# Patient Record
Sex: MALE | Race: WHITE | NOT HISPANIC OR LATINO | Employment: OTHER | URBAN - METROPOLITAN AREA
[De-identification: names, ages, dates, MRNs, and addresses within clinical notes are randomized per-mention and may not be internally consistent; named-entity substitution may affect disease eponyms.]

---

## 2018-08-13 ENCOUNTER — OFFICE VISIT (OUTPATIENT)
Dept: MEDICAL GROUP | Facility: MEDICAL CENTER | Age: 78
End: 2018-08-13
Payer: MEDICARE

## 2018-08-13 VITALS
WEIGHT: 149 LBS | DIASTOLIC BLOOD PRESSURE: 60 MMHG | BODY MASS INDEX: 25.44 KG/M2 | HEART RATE: 92 BPM | HEIGHT: 64 IN | RESPIRATION RATE: 16 BRPM | SYSTOLIC BLOOD PRESSURE: 110 MMHG | OXYGEN SATURATION: 99 % | TEMPERATURE: 98.3 F

## 2018-08-13 DIAGNOSIS — G20.A1 PARKINSON DISEASE: ICD-10-CM

## 2018-08-13 DIAGNOSIS — G47.33 OSA (OBSTRUCTIVE SLEEP APNEA): ICD-10-CM

## 2018-08-13 DIAGNOSIS — J98.4 SCARRING OF LUNG: ICD-10-CM

## 2018-08-13 DIAGNOSIS — E78.2 MIXED HYPERLIPIDEMIA: ICD-10-CM

## 2018-08-13 DIAGNOSIS — F02.80 DEMENTIA ASSOCIATED WITH OTHER UNDERLYING DISEASE WITHOUT BEHAVIORAL DISTURBANCE (HCC): ICD-10-CM

## 2018-08-13 DIAGNOSIS — R15.9 INCONTINENCE OF FECES, UNSPECIFIED FECAL INCONTINENCE TYPE: ICD-10-CM

## 2018-08-13 DIAGNOSIS — I10 ESSENTIAL HYPERTENSION: ICD-10-CM

## 2018-08-13 DIAGNOSIS — Z00.00 HEALTH MAINTENANCE EXAMINATION: ICD-10-CM

## 2018-08-13 PROCEDURE — 99204 OFFICE O/P NEW MOD 45 MIN: CPT | Performed by: FAMILY MEDICINE

## 2018-08-13 RX ORDER — LOSARTAN POTASSIUM 50 MG/1
TABLET ORAL
COMMUNITY
Start: 2018-06-11 | End: 2018-10-11 | Stop reason: SDUPTHER

## 2018-08-13 RX ORDER — ATORVASTATIN CALCIUM 20 MG/1
20 TABLET, FILM COATED ORAL
COMMUNITY
Start: 2018-07-19 | End: 2018-10-11 | Stop reason: SDUPTHER

## 2018-08-13 RX ORDER — ASPIRIN 81 MG/1
81 TABLET, CHEWABLE ORAL DAILY
COMMUNITY
End: 2019-10-10

## 2018-08-13 RX ORDER — ESOMEPRAZOLE MAGNESIUM 40 MG/1
CAPSULE, DELAYED RELEASE ORAL
COMMUNITY
Start: 2018-06-14 | End: 2019-02-21 | Stop reason: SDUPTHER

## 2018-08-13 RX ORDER — LATANOPROST 50 UG/ML
1 SOLUTION/ DROPS OPHTHALMIC
COMMUNITY

## 2018-08-13 RX ORDER — BRINZOLAMIDE 10 MG/ML
SUSPENSION/ DROPS OPHTHALMIC
COMMUNITY
Start: 2018-08-06 | End: 2019-11-07

## 2018-08-13 RX ORDER — DONEPEZIL HYDROCHLORIDE 5 MG/1
TABLET, FILM COATED ORAL
COMMUNITY
Start: 2018-06-11 | End: 2018-09-10 | Stop reason: SDUPTHER

## 2018-08-13 NOTE — PROGRESS NOTES
CC: new patient ( PKD, HTN, HLD, GENIA, lung scar, dementia)    HPI:  Michael presents today to establish a new PCP. Has just moved from [facility].    Patient has been active, and independent with ADLs. Has the following medical issues:    Parkinson disease (HCC)  It has been slowly progressive. Has been affecting his physical abilities and memory, however he still independent physucally and mentally. Has been on Sinemet 25/100 mg TID.Used to have a neurology at [facility] wants to establish with one in [place].An appointment is already done with Dr Brown in 10/2018    Essential hypertension  Has been adequately controlled on current medication. Denies headache, chest pain, and SOB.Has been on Losartan 50 mg daily.    Mixed hyperlipidemia  He has been tolerating the statin. Denies muscle pain LFTs has been normal, hs been on Lipitor 20 mg daily.    GENIA (obstructive sleep apnea)/ Scarring of Lung   Patient has been doing fine on the CPAP.Has h/o lung scar has been following up with pulmonology in [place] wants to establish with one here.    Dementia associated with other underlying disease without behavioral disturbance  His memory has been slowly declined which is probably related to PKD. However he has been independent mentally band physically. Has been tolerating the Aricept.    Incontinence of feces, unspecified fecal incontinence type  It has been a chronic issue,he has had multiple work up and GI consults at [place], eventualy he was told that he need to live with it.He stated that it has an accident every once in a while.has been avoiding socialization because of that. Denies depression, he learned to live with it.    As per patient all vaccinations are UTD,no records.Awaiting his old records from his PCP in [place].      Patient Active Problem List    Diagnosis Date Noted   • Parkinson disease (HCC) 08/13/2018   • Essential hypertension 08/13/2018   • Mixed hyperlipidemia 08/13/2018   • GENIA (obstructive sleep  "apnea) 08/13/2018   • Dementia associated with other underlying disease without behavioral disturbance 08/13/2018   • Incontinence of feces 08/13/2018       Current Outpatient Prescriptions   Medication Sig Dispense Refill   • atorvastatin (LIPITOR) 20 MG Tab Take 20 mg by mouth.     • AZOPT 1 % Suspension      • carbidopa-levodopa (SINEMET)  MG Tab      • donepezil (ARICEPT) 5 MG Tab      • esomeprazole (NEXIUM) 40 MG delayed-release capsule      • latanoprost (XALATAN) 0.005 % Solution      • losartan (COZAAR) 50 MG Tab      • aspirin (ASA) 81 MG Chew Tab chewable tablet Take 81 mg by mouth every day.     • Cholecalciferol 4000 units Cap Take  by mouth.       No current facility-administered medications for this visit.          Allergies as of 08/13/2018   • (Not on File)        Social History     Social History   • Marital status:      Spouse name: N/A   • Number of children: N/A   • Years of education: N/A     Occupational History   • Not on file.     Social History Main Topics   • Smoking status: Not on file   • Smokeless tobacco: Not on file   • Alcohol use Not on file   • Drug use: Unknown   • Sexual activity: Not on file     Other Topics Concern   • Not on file     Social History Narrative   • No narrative on file       No family history on file.    No past surgical history on file.    ROS:  Denies any Headache, Blurred Vision, Confusion Chest pain,  Shortness of breath,  Abdominal pain, Changes of bowel or bladder, Lower ext edema, Fevers, Nights sweats, Weight Changes, Focal weakness or numbness.  All other systems are negative.    /60   Pulse 92   Temp 36.8 °C (98.3 °F)   Resp 16   Ht 1.63 m (5' 4.17\")   Wt 67.6 kg (149 lb)   SpO2 99%   BMI 25.44 kg/m²     Physical Exam:  Gen:         Alert and oriented, No apparent distress.  HEENT:   Perrla, TM clear,  Oralpharynx no erythema or exudates.  Neck:       No Jugular venous distension, Lymphadenopathy, Thyromegaly, Bruits.  Lungs:   "   Clear to auscultation bilaterally  CV:          Regular rate and rhythm. No murmurs, rubs or gallops.  Abd:         Soft non tender, non distended. Normal active bowel sounds. No                                        Hepatosplenomegaly, No pulsatile masses.  Ext:          No clubbing, cyanosis, edema.      Assessment and Plan.   78 y.o. male     1. Parkinson disease (HCC)  Stable.  Has been on Sinemet 25/100 mg TID.  An appointment is already done with Dr Brown in 10/2018    2. Essential hypertension  Has been adequately controlled on current medication. Denies headache, chest pain, and SOB.  Continue on Losartan 50 mg daily.    - CBC WITH DIFFERENTIAL; Future  - LIPID PANEL  - COMP METABOLIC PANEL; Future    3. Mixed hyperlipidemia  He has been tolerating the statin. Denies muscle pain LFTs has been normal  Continue on Lipitor 20 mg daily.    - LIPID PANEL  - TSH; Future    4. GENIA (obstructive sleep apnea)/ Scarring of Lung   Patient has been doing fine on the CPAP.  Has h/o lung scar has been following up with pulmonology in [place] wants to establish with one here.    - REFERRAL TO PULMONOLOGY    5. Dementia associated with other underlying disease without behavioral disturbance  Probably PKD related,memory has been slowly declined. However he has been independent mentally band physically  Continue in Aricept, no side effects.    6. Incontinence of feces, unspecified fecal incontinence type  A chronic issue, has had multiple work up and GI consults at [place], eventualy was told he need to live with it.    7. Health maintenance examination  As per patient all vaccinations are UTD,

## 2018-08-15 ENCOUNTER — TELEPHONE (OUTPATIENT)
Dept: MEDICAL GROUP | Facility: MEDICAL CENTER | Age: 78
End: 2018-08-15

## 2018-08-15 NOTE — TELEPHONE ENCOUNTER
[phone] ([place])   Phone Number Called:     Message: left patient a message returning his call to see how we can help him    Left Message for patient to call back: yes

## 2018-08-16 ENCOUNTER — PATIENT MESSAGE (OUTPATIENT)
Dept: MEDICAL GROUP | Facility: MEDICAL CENTER | Age: 78
End: 2018-08-16

## 2018-08-16 NOTE — TELEPHONE ENCOUNTER
From: Michael Packer  To: Edwar Beal M.D.  Sent: 8/16/2018 9:34 AM PDT  Subject: Non-Urgent Medical Question    Gastroento)ogy Dr. MAURICIO SAMUELS

## 2018-09-10 DIAGNOSIS — F02.80 DEMENTIA ASSOCIATED WITH OTHER UNDERLYING DISEASE WITHOUT BEHAVIORAL DISTURBANCE (HCC): ICD-10-CM

## 2018-09-10 RX ORDER — DONEPEZIL HYDROCHLORIDE 5 MG/1
TABLET, FILM COATED ORAL
Qty: 30 TAB | Status: CANCELLED | OUTPATIENT
Start: 2018-09-10

## 2018-09-10 RX ORDER — DONEPEZIL HYDROCHLORIDE 5 MG/1
5 TABLET, FILM COATED ORAL DAILY
Qty: 90 TAB | Refills: 3 | Status: SHIPPED | OUTPATIENT
Start: 2018-09-10 | End: 2018-11-09

## 2018-10-11 RX ORDER — LOSARTAN POTASSIUM 50 MG/1
50 TABLET ORAL DAILY
Qty: 90 TAB | Refills: 3 | Status: SHIPPED | OUTPATIENT
Start: 2018-10-11 | End: 2018-11-13 | Stop reason: SDUPTHER

## 2018-10-11 RX ORDER — ATORVASTATIN CALCIUM 20 MG/1
20 TABLET, FILM COATED ORAL DAILY
Qty: 90 TAB | Refills: 3 | Status: SHIPPED | OUTPATIENT
Start: 2018-10-11 | End: 2018-11-13 | Stop reason: SDUPTHER

## 2018-11-08 ENCOUNTER — HOSPITAL ENCOUNTER (OUTPATIENT)
Dept: LAB | Facility: MEDICAL CENTER | Age: 78
End: 2018-11-08
Attending: FAMILY MEDICINE
Payer: MEDICARE

## 2018-11-08 DIAGNOSIS — E78.2 MIXED HYPERLIPIDEMIA: ICD-10-CM

## 2018-11-08 DIAGNOSIS — I10 ESSENTIAL HYPERTENSION: ICD-10-CM

## 2018-11-08 LAB
ALBUMIN SERPL BCP-MCNC: 3.9 G/DL (ref 3.2–4.9)
ALBUMIN/GLOB SERPL: 1.6 G/DL
ALP SERPL-CCNC: 65 U/L (ref 30–99)
ALT SERPL-CCNC: <5 U/L (ref 2–50)
ANION GAP SERPL CALC-SCNC: 8 MMOL/L (ref 0–11.9)
AST SERPL-CCNC: 17 U/L (ref 12–45)
BASOPHILS # BLD AUTO: 0.8 % (ref 0–1.8)
BASOPHILS # BLD: 0.07 K/UL (ref 0–0.12)
BILIRUB SERPL-MCNC: 0.5 MG/DL (ref 0.1–1.5)
BUN SERPL-MCNC: 19 MG/DL (ref 8–22)
CALCIUM SERPL-MCNC: 9.4 MG/DL (ref 8.5–10.5)
CHLORIDE SERPL-SCNC: 107 MMOL/L (ref 96–112)
CHOLEST SERPL-MCNC: 150 MG/DL (ref 100–199)
CO2 SERPL-SCNC: 26 MMOL/L (ref 20–33)
CREAT SERPL-MCNC: 0.89 MG/DL (ref 0.5–1.4)
EOSINOPHIL # BLD AUTO: 0.28 K/UL (ref 0–0.51)
EOSINOPHIL NFR BLD: 3.1 % (ref 0–6.9)
ERYTHROCYTE [DISTWIDTH] IN BLOOD BY AUTOMATED COUNT: 46.3 FL (ref 35.9–50)
FASTING STATUS PATIENT QL REPORTED: NORMAL
GLOBULIN SER CALC-MCNC: 2.5 G/DL (ref 1.9–3.5)
GLUCOSE SERPL-MCNC: 101 MG/DL (ref 65–99)
HCT VFR BLD AUTO: 47.5 % (ref 42–52)
HDLC SERPL-MCNC: 45 MG/DL
HGB BLD-MCNC: 15.3 G/DL (ref 14–18)
IMM GRANULOCYTES # BLD AUTO: 0.06 K/UL (ref 0–0.11)
IMM GRANULOCYTES NFR BLD AUTO: 0.7 % (ref 0–0.9)
LDLC SERPL CALC-MCNC: 83 MG/DL
LYMPHOCYTES # BLD AUTO: 1.59 K/UL (ref 1–4.8)
LYMPHOCYTES NFR BLD: 17.4 % (ref 22–41)
MCH RBC QN AUTO: 30.5 PG (ref 27–33)
MCHC RBC AUTO-ENTMCNC: 32.2 G/DL (ref 33.7–35.3)
MCV RBC AUTO: 94.8 FL (ref 81.4–97.8)
MONOCYTES # BLD AUTO: 0.88 K/UL (ref 0–0.85)
MONOCYTES NFR BLD AUTO: 9.6 % (ref 0–13.4)
NEUTROPHILS # BLD AUTO: 6.28 K/UL (ref 1.82–7.42)
NEUTROPHILS NFR BLD: 68.4 % (ref 44–72)
NRBC # BLD AUTO: 0 K/UL
NRBC BLD-RTO: 0 /100 WBC
PLATELET # BLD AUTO: 204 K/UL (ref 164–446)
PMV BLD AUTO: 10.4 FL (ref 9–12.9)
POTASSIUM SERPL-SCNC: 4.3 MMOL/L (ref 3.6–5.5)
PROT SERPL-MCNC: 6.4 G/DL (ref 6–8.2)
RBC # BLD AUTO: 5.01 M/UL (ref 4.7–6.1)
SODIUM SERPL-SCNC: 141 MMOL/L (ref 135–145)
TRIGL SERPL-MCNC: 111 MG/DL (ref 0–149)
TSH SERPL DL<=0.005 MIU/L-ACNC: 2.84 UIU/ML (ref 0.38–5.33)
WBC # BLD AUTO: 9.2 K/UL (ref 4.8–10.8)

## 2018-11-08 PROCEDURE — 85025 COMPLETE CBC W/AUTO DIFF WBC: CPT

## 2018-11-08 PROCEDURE — 80061 LIPID PANEL: CPT

## 2018-11-08 PROCEDURE — 80053 COMPREHEN METABOLIC PANEL: CPT

## 2018-11-08 PROCEDURE — 84443 ASSAY THYROID STIM HORMONE: CPT

## 2018-11-08 PROCEDURE — 36415 COLL VENOUS BLD VENIPUNCTURE: CPT

## 2018-11-09 ENCOUNTER — OFFICE VISIT (OUTPATIENT)
Dept: NEUROLOGY | Facility: MEDICAL CENTER | Age: 78
End: 2018-11-09
Payer: MEDICARE

## 2018-11-09 VITALS
OXYGEN SATURATION: 93 % | SYSTOLIC BLOOD PRESSURE: 126 MMHG | HEART RATE: 79 BPM | TEMPERATURE: 97.8 F | WEIGHT: 149 LBS | BODY MASS INDEX: 23.95 KG/M2 | DIASTOLIC BLOOD PRESSURE: 74 MMHG | RESPIRATION RATE: 18 BRPM | HEIGHT: 66 IN

## 2018-11-09 DIAGNOSIS — G20.A1 PARKINSON DISEASE: Primary | ICD-10-CM

## 2018-11-09 PROCEDURE — 99205 OFFICE O/P NEW HI 60 MIN: CPT | Performed by: PSYCHIATRY & NEUROLOGY

## 2018-11-09 RX ORDER — ROPINIROLE 2 MG/1
TABLET, FILM COATED, EXTENDED RELEASE ORAL
Qty: 90 TAB | Refills: 2 | Status: SHIPPED | OUTPATIENT
Start: 2018-11-09 | End: 2018-12-24 | Stop reason: SDUPTHER

## 2018-11-09 ASSESSMENT — ENCOUNTER SYMPTOMS
SPEECH CHANGE: 1
SENSORY CHANGE: 0
LOSS OF CONSCIOUSNESS: 0
CONSTIPATION: 0
DIZZINESS: 0
DEPRESSION: 0
INSOMNIA: 0
TREMORS: 1
MEMORY LOSS: 1
HALLUCINATIONS: 0

## 2018-11-09 ASSESSMENT — PATIENT HEALTH QUESTIONNAIRE - PHQ9: CLINICAL INTERPRETATION OF PHQ2 SCORE: 0

## 2018-11-10 NOTE — PROGRESS NOTES
Subjective:      Michael Packer is a 78 y.o. male who presents his wife Cecilia, for consultation from the office of Dr. Cuellar, with a history of Parkinson's disease and associated cognitive impairment.     MIRELLA Rodriguez is a pleasant 78-year-old right-handed gentleman who symptoms started may be about 2 years ago, probably longer, but his diagnosis and treatment started about 2 years ago.    Originally he presented with a right upper extremity rigidity and tremulousness.  Cognition seem to be a little slowed but it was not the major problem.  He was sleeping well, there were no issues with hallucinations or delirium, it was just taking him longer to do things.  He was a little bit unsteady when he was walking.  He also has had issues with anosmia for years.    Seen by a local neurologist, MRI imaging and even an EEG study were done, they were never told the specifics as to the results themselves.  He was placed on Sinemet 25/100, twice daily, and the symptoms actually improved, though over time the efficacy diminished.  The drug now wears off at about 3-4 p.m., he takes the doses at 8 AM and 7 PM.  He takes them also with food!  Because of the cognitive issues, Aricept was added one year ago, but at 5 mg daily, neither 1 of them have been overly impressed.  At present, they deny freezing, significant fluctuations on a day-to-day basis and symptoms and dose response, peak-dose dyskinesias, hallucinations, etc.    At present he does have real problems with his thinking.  He is much slower to respond, mental processing in general is slower, it takes him longer to finish activities that had once been automatic and easy.  He has been forgetting passwords while on the computer, evidently stopped dealing with their financial investments because they had become too complex.  He does his own medications though Meghan will have to remind him on an occasion.  Major changes in their life, such as their move from [facility]  [place] to the [place] area, can put him into a tailspin, he actually was severely depressed for months, lost about 15 pounds, etc.    Speech is a little softer, chewing and swallowing are unchanged, he does not drool.  The right hand tremor has gotten a little worse, handwriting and dexterity with the right hand have taken a nose dive.  Overall strength is intact.  Stride length is diminished, he notes a slowness on initiation of all movements, things always improved slightly once he gets going.  He does not fall with regularity.  He fatigues quite easily.  Standing up from seats is a little more difficult especially if it is low-slung.    Constipation and urinary retention or not an issue, in fact he has more of a chronic issue with urinary incontinence.  They deny issues with early satiety and frequent nausea or vomiting, weight loss, orthostatic dizziness and syncope, etc.    He has a history of glaucoma, GENIA, hypertension, dyslipidemia, hearing impairment and GERD, no history of CVA, CAD, PVD, malignancy, thyroid disease, autoimmune disease, psychiatric disease, liver or kidney disease, blood dyscrasia, or pulmonary disease.  There is no surgical history of note from my standpoint.    His mother  at 93, his father at 86, he may have suffered from stroke, his one brother did suffer from stroke and hypertension, his 4 children are alive and well.  No one in the family tree has a history of neurodegenerative disease.  He does not smoke or drink.  He has a one-year level college education, worked as a clerical staff member.    He is on baby aspirin daily, Lipitor 20 mg daily, Cozaar 50 mg daily, Nexium 40 mg daily, Azopt and Zilactin eyedrops, Sinemet 25/100, twice daily, and Aricept 5 mg every morning.    Review of Systems   Constitutional: Positive for malaise/fatigue.   Cardiovascular: Negative for leg swelling.   Gastrointestinal: Negative for constipation.   Genitourinary: Negative for dysuria.   Neurological:  "Positive for tremors and speech change. Negative for dizziness, sensory change and loss of consciousness.   Psychiatric/Behavioral: Positive for memory loss. Negative for depression and hallucinations. The patient does not have insomnia.    All other systems reviewed and are negative.        Objective:     /74   Pulse 79   Temp 36.6 °C (97.8 °F)   Resp 18   Ht 1.676 m (5' 6\")   Wt 67.6 kg (149 lb)   SpO2 93%   BMI 24.05 kg/m²      Physical Exam    He appears in no acute distress.  His vital signs are stable.  There is no malar rash, temporal or jaw tenderness, jaw claudication, or sialorrhea.  The neck is supple, range of motion is full, carotid pulses are present bilaterally without asymmetry.  Cardiac evaluation reveals a regular rhythm.  There is minimal bilateral lower extremity edema only.    His mental processing speed is slowed, he is a little perseverative, there is some mild apraxia only, but he is still fully oriented, there is no aphasia, agnosia or inattention.    PERRLA/EOMI, visual fields are full to finger counting and confrontation bilaterally, funduscopic exam could not be done because of myopia, there is hypophonia and mild tachyphemia, eye blink frequency is clearly reduced, facial movements are symmetric, sensory exam was intact to temperature and pinprick bilaterally, the tongue and uvula are midline, shoulder shrug and head rotation are intact.    Musculoskeletal exam reveals bilateral rigidity, right greater than left, high amplitude and low frequency resting tremor is seen with the right hand, increased with distraction, decreasing with movement initiation and then re-emergent.  There is generalized bradykinesia, there is no asterixis or drift.  Strength is intact in all 4 extremities.  Reflexes are brisk and present throughout, the ankle jerks are clearly diminished bilaterally though they are present.  Both toes are downgoing.    He stands slowly but independently, arms folded " in front.  There is slight hesitancy on gait initiation only, he turns stiffly but easily, requires only 3 steps.  The right arm swing is clearly diminished when compared to the left, there is some stiffness when moving the right leg but stride length is maintained otherwise.  There is some mild retropulsion.  There is no appendicular dystaxia but throughout, repetitive movements and fine motor control showed diminished amplitudes and increased frequencies bilaterally.    Sensory exam is intact to temperature and pinprick, there is a minimal stocking pattern loss of vibration below the ankles bilaterally.  Romberg is absent.     Assessment/Plan:     1. Parkinson disease (HCC)  The diagnosis I think is fairly straightforward, in this clinical setting, absent associated symptoms, given the asymmetric presentation and what seems to be a very clear response to dopamine, I do not think Parkinson's-plus syndrome such as Lewy Body Dementia, MSA, PSP, etc., or less likely.  Imaging has been done, I suspect that no news was good news at the time, thus structural pathology such as ischemia, NPH, mass, heavy metal deposition, etc. have been ruled out.  In those circumstances, clinically, dopamine would have provided no benefit.    Unfortunately, even Parkinson's-plus syndromes can respond to dopamine early on, but treatments would still be the same.  For now I think Sinemet needs to be changed to avoid the wearing-off, he also needs to be told to take the drug 30 minutes before he eats to avoid interaction with protein that is consumed.  Thus, Sinemet will be taken at 7 AM and 4 PM, at least 30 minutes before each meal.  Requip ER 2 mg daily will be started with the first dose of Sinemet, in 2 weeks the dose increased to 4 mg.  The rationale for this was reviewed in full.  Side effects were reviewed.    The cognitive symptoms he has are not unheard of, in fact over 60% of patients with Parkinson's disease have  neuropsychological symptoms.  Aricept is not the drug of choice though it can provide benefit, so I would discontinue the drug immediately and follow along, eventually I would like to add Exelon.  One drug change at a time is necessary.  The rationale for this was reviewed.    We talked at length about the nature of his disease, why I suspect this is his diagnosis, what to expect moving forwards, prognosis, expected long-term progression, etc.  We will play phone tag as we adjust his medicines, we will follow-up otherwise in about 5 months.    - carbidopa-levodopa (SINEMET)  MG Tab; Take 1 Tab by mouth 2 times a day. Take at 7AM and 4PM  Dispense: 60 Tab; Refill: 4  - Ropinirole HCl 2 MG TABLET SR 24 HR; 1 tab daily for 2 weeks, increase by 1 tab every 2 weeks up to 3 tab daily  Dispense: 90 Tab; Refill: 2    Time: 60 minutes spent face-to-face for exam, review, discussion, and education, of this over 50% of time spent counseling and coordinating care.

## 2018-11-13 ENCOUNTER — OFFICE VISIT (OUTPATIENT)
Dept: MEDICAL GROUP | Facility: MEDICAL CENTER | Age: 78
End: 2018-11-13
Payer: MEDICARE

## 2018-11-13 VITALS
HEART RATE: 88 BPM | RESPIRATION RATE: 16 BRPM | SYSTOLIC BLOOD PRESSURE: 112 MMHG | DIASTOLIC BLOOD PRESSURE: 74 MMHG | OXYGEN SATURATION: 92 % | HEIGHT: 66 IN | TEMPERATURE: 98.2 F | BODY MASS INDEX: 24.91 KG/M2 | WEIGHT: 155 LBS

## 2018-11-13 DIAGNOSIS — G47.33 OSA (OBSTRUCTIVE SLEEP APNEA): ICD-10-CM

## 2018-11-13 DIAGNOSIS — F02.80 DEMENTIA ASSOCIATED WITH OTHER UNDERLYING DISEASE WITHOUT BEHAVIORAL DISTURBANCE (HCC): ICD-10-CM

## 2018-11-13 DIAGNOSIS — K21.9 GASTROESOPHAGEAL REFLUX DISEASE WITHOUT ESOPHAGITIS: ICD-10-CM

## 2018-11-13 DIAGNOSIS — I10 ESSENTIAL HYPERTENSION: ICD-10-CM

## 2018-11-13 DIAGNOSIS — E78.2 MIXED HYPERLIPIDEMIA: ICD-10-CM

## 2018-11-13 DIAGNOSIS — J84.9 INTERSTITIAL LUNG DISEASE (HCC): ICD-10-CM

## 2018-11-13 DIAGNOSIS — Z23 NEED FOR VACCINATION: ICD-10-CM

## 2018-11-13 DIAGNOSIS — G20.A1 PARKINSON DISEASE: ICD-10-CM

## 2018-11-13 PROCEDURE — 99214 OFFICE O/P EST MOD 30 MIN: CPT | Mod: 25 | Performed by: FAMILY MEDICINE

## 2018-11-13 PROCEDURE — 90662 IIV NO PRSV INCREASED AG IM: CPT | Performed by: FAMILY MEDICINE

## 2018-11-13 PROCEDURE — G0008 ADMIN INFLUENZA VIRUS VAC: HCPCS | Performed by: FAMILY MEDICINE

## 2018-11-13 RX ORDER — ATORVASTATIN CALCIUM 20 MG/1
20 TABLET, FILM COATED ORAL DAILY
Qty: 90 TAB | Refills: 3 | Status: SHIPPED | OUTPATIENT
Start: 2018-11-13 | End: 2019-12-23 | Stop reason: SDUPTHER

## 2018-11-13 RX ORDER — RANITIDINE 150 MG/1
150 TABLET ORAL 2 TIMES DAILY
Qty: 60 TAB | Refills: 11 | Status: SHIPPED | OUTPATIENT
Start: 2018-11-13 | End: 2019-10-10

## 2018-11-13 RX ORDER — LOSARTAN POTASSIUM 50 MG/1
50 TABLET ORAL DAILY
Qty: 90 TAB | Refills: 3 | Status: SHIPPED | OUTPATIENT
Start: 2018-11-13 | End: 2020-01-01

## 2018-11-13 NOTE — PROGRESS NOTES
CC: Parkinson disease, hypertension, hyperlipidemia, sleep apnea, memory problems, acid reflux.    HPI:   Michael presents today to discuss the following    Parkinson disease (HCC)  It has been slowly progressive. Has been affecting his physical abilities and memory, however he still independent physically and mentally. Has been on Sinemet 25/100 mg twice daily, ropinirole 2 mg daily was added recently by the neurology Dr. Welsh.     Essential hypertension  Has been adequately controlled on current medication. Denies headache, chest pain, and SOB.Has been on Losartan 50 mg daily.     Mixed hyperlipidemia  He has been tolerating the statin. Denies muscle pain LFTs has been normal, hs been on Lipitor 20 mg daily.     GENIA (obstructive sleep apnea)/ Scarring of Lung / Interstitial lung disease (HCC)  Patient has history of interstitial lung disease however she has been has been asymptomatic. He has been doing fine on the CPAP.Has h/o lung scar has been following up with pulmonology in [place], requested referral to pulmonology.     Dementia associated with other underlying disease without behavioral disturbance  His memory has been slowly declined which is probably related to PKD. However he has been independent mentally band physically.  Patient was seen by neurology Dr. Welsh who changed Aricept to Exelon.    Gastroesophageal reflux disease without esophagitis  Denies epigastric pain, heartburn, nausea, and vomiting.  He has been doing fine on Nexium 40 mg daily.  However we discussed side effects of PPI, discussed changing the medication to H2 antagonist.    He is due for the flu shot          Patient Active Problem List    Diagnosis Date Noted   • Parkinson disease (HCC) 08/13/2018   • Essential hypertension 08/13/2018   • Mixed hyperlipidemia 08/13/2018   • GENIA (obstructive sleep apnea) 08/13/2018   • Dementia associated with other underlying disease without behavioral disturbance 08/13/2018   • Incontinence of  "feces 08/13/2018       Current Outpatient Prescriptions   Medication Sig Dispense Refill   • carbidopa-levodopa (SINEMET)  MG Tab Take 1 Tab by mouth 2 times a day. Take at 7AM and 4PM 60 Tab 4   • Ropinirole HCl 2 MG TABLET SR 24 HR 1 tab daily for 2 weeks, increase by 1 tab every 2 weeks up to 3 tab daily 90 Tab 2   • losartan (COZAAR) 50 MG Tab Take 1 Tab by mouth every day. 90 Tab 3   • atorvastatin (LIPITOR) 20 MG Tab Take 1 Tab by mouth every day. 90 Tab 3   • AZOPT 1 % Suspension      • esomeprazole (NEXIUM) 40 MG delayed-release capsule      • latanoprost (XALATAN) 0.005 % Solution      • aspirin (ASA) 81 MG Chew Tab chewable tablet Take 81 mg by mouth every day.     • Cholecalciferol 4000 units Cap Take  by mouth.       No current facility-administered medications for this visit.          Allergies as of 11/13/2018   • (No Known Allergies)        ROS: Denies any chest pain, Shortness of breath, Changes bowel or bladder, Lower extremity edema.    Physical Exam:  /74 (BP Location: Right arm, Patient Position: Sitting, BP Cuff Size: Adult)   Pulse 88   Temp 36.8 °C (98.2 °F)   Resp 16   Ht 1.676 m (5' 6\")   Wt 70.3 kg (155 lb)   SpO2 92%   BMI 25.02 kg/m²   Gen.: Well-developed, well-nourished, no apparent distress,pleasant and cooperative with the examination  Skin:  Warm and dry with good turgor. No rashes or suspicious lesions in visible areas  HEENT:Sinuses nontender with palpation, TMs clear, nares patent with pink mucosa and clear rhinorrhea,no septal deviation ,polyps or lesions. lips without lesions, oropharynx clear.  Neck: Trachea midline,no masses or adenopathy. No JVD.  Cor: Regular rate and rhythm without murmur, gallop or rub.  Lungs: Respirations unlabored.Clear to auscultation with equal breath sounds bilaterally. No wheezes, rhonchi.  Extremities: No cyanosis, clubbing or edema.      Assessment and Plan.   78 y.o. male     1. Parkinson disease (HCC)  Stable.  Continue on " Sinemet  mg twice a day.  Continue on ropinirole as prescribed(1 tab daily for 2 weeks, increase by 1 tab every 2 weeks up to 3 tab daily)  Continue follow-up with neurology Dr. Welsh.    2. Essential hypertension  Has been adequately controlled on current medication. Denies headache, chest pain, and SOB.  Continue on losartan 50 mg daily.    3. Mixed hyperlipidemia  He has been tolerating the statin. Denies muscle pain LFTs has been normal  Continue on atorvastatin 20 mg daily.    4. Dementia associated with other underlying disease without behavioral disturbance  Probably related to Parkinson disease.  Patient has been stable.  Aricept was substituted by Exelon.    5. Gastroesophageal reflux disease without esophagitis  Has been doing fine on Nexium 40 mg daily.  However we discussed side effects of PPI, discussed changing the medication to H2 antagonist.  Zantac 150 milligrams twice a day ordered    - raNITidine (ZANTAC) 150 MG Tab; Take 1 Tab by mouth 2 times a day.  Dispense: 60 Tab; Refill: 11    6. Need for vaccination  Had a flu shot today.    - INFLUENZA VACCINE, HIGH DOSE (65+ ONLY)    7. GENIA (obstructive sleep apnea)  Has been doing fine on CPAP.  - REFERRAL TO PULMONOLOGY    8. Interstitial lung disease (HCC)  Patient has history of interstitial lung disease however she has been has been asymptomatic. He requested referral to pulmonology.    - REFERRAL TO PULMONOLOGY      Please note that this dictation was created using voice recognition software. I have made every reasonable attempt to correct obvious errors but there may be errors of grammar and content that I may have overlooked prior to finalization of this note.

## 2018-11-26 ENCOUNTER — TELEPHONE (OUTPATIENT)
Dept: NEUROLOGY | Facility: MEDICAL CENTER | Age: 78
End: 2018-11-26

## 2018-11-26 NOTE — TELEPHONE ENCOUNTER
Patient called for Dr. Brown to inform him that he is on his 3rd week of Ropinerole 2mg. Today is his first day taking #2. He says so far he has not seen any improvement. JELENA only.

## 2018-12-13 NOTE — TELEPHONE ENCOUNTER
Patient called again for Dr. Brown stating he is now on ropinerole 2 mg TID and has still seen no difference with the medication. He is wondering if he needs to try a different medication? Please advise.

## 2018-12-24 DIAGNOSIS — G20.A1 PARKINSON DISEASE: ICD-10-CM

## 2018-12-26 RX ORDER — ROPINIROLE 2 MG/1
6 TABLET, FILM COATED, EXTENDED RELEASE ORAL DAILY
Qty: 270 TAB | Refills: 0 | Status: SHIPPED | OUTPATIENT
Start: 2018-12-26 | End: 2019-01-30 | Stop reason: SDUPTHER

## 2018-12-28 ENCOUNTER — OFFICE VISIT (OUTPATIENT)
Dept: PULMONOLOGY | Facility: HOSPICE | Age: 78
End: 2018-12-28
Payer: MEDICARE

## 2018-12-28 VITALS
TEMPERATURE: 99 F | OXYGEN SATURATION: 98 % | RESPIRATION RATE: 16 BRPM | SYSTOLIC BLOOD PRESSURE: 130 MMHG | WEIGHT: 160 LBS | HEART RATE: 76 BPM | DIASTOLIC BLOOD PRESSURE: 80 MMHG | BODY MASS INDEX: 26.66 KG/M2 | HEIGHT: 65 IN

## 2018-12-28 DIAGNOSIS — J84.9 ILD (INTERSTITIAL LUNG DISEASE) (HCC): ICD-10-CM

## 2018-12-28 DIAGNOSIS — G20.A1 PARKINSON DISEASE: ICD-10-CM

## 2018-12-28 DIAGNOSIS — G47.33 OSA (OBSTRUCTIVE SLEEP APNEA): ICD-10-CM

## 2018-12-28 PROCEDURE — 99204 OFFICE O/P NEW MOD 45 MIN: CPT | Performed by: INTERNAL MEDICINE

## 2018-12-28 RX ORDER — ATORVASTATIN CALCIUM 20 MG/1
TABLET, FILM COATED ORAL
COMMUNITY
Start: 2018-12-23 | End: 2019-04-09

## 2018-12-28 RX ORDER — DONEPEZIL HYDROCHLORIDE 5 MG/1
TABLET, FILM COATED ORAL
COMMUNITY
Start: 2018-11-10 | End: 2019-04-09

## 2018-12-28 ASSESSMENT — PAIN SCALES - GENERAL: PAINLEVEL: NO PAIN

## 2018-12-31 NOTE — PROGRESS NOTES
Chief Complaint   Patient presents with   • New Patient     Interstitial Lung Disease       HPI:  The patient is a 78-year-old man who moved into this area about 6 months ago from [place].  He has a history of cough for a number of years.  His cough is usually nonproductive.  He has never had any hemoptysis.  He says he has had wheezing on occasion.  He does get short of breath with prolonged walking.  He has Parkinson's disease and is not very active.  He is a never smoker.  In [place] he was followed by Dr. Aparicio.  A chest CT done on 3/15/2017 revealed patchy subpleural reticulation in the dependent portions of both lower lobes.  It was felt to represent scarring, chronic aspiration, or early interstitial lung disease.  There was no evidence of consolidation or nodules.  His pulmonary function testing in May 2018 demonstrated an FEV1 of 2.47 L which is 99% of predicted.  His TLC was 88% of predicted.  However his DLCO is reduced at 55% of predicted.  A 6-minute walk demonstrated oxygen desaturation down to 78% at the end of 6 minutes.  The patient declined supplemental oxygen.  The patient also was diagnosed with obstructive sleep apnea.  He was started on CPAP at 8 cm of water pressure.  He tends to wear his CPAP for naps in the afternoon.  However, he cannot tolerate the CPAP at night.  When he wears his equipment is resulting AHI is 1.1 events per hour.  The patient is not complaining of significant shortness of breath at rest or with his usual activity.  He says that he does not choke on his food.  He denies any aspiration.  He does have a history of gastroesophageal reflux and is on Nexium.    Past Medical History:   Diagnosis Date   • Chickenpox    • Daytime sleepiness    • GERD (gastroesophageal reflux disease)    • Glaucoma    • Heartburn    • Hyperlipidemia    • Hypertension    • Kidney stone    • Mumps    • Muscle disorder    • Sleep apnea    • Wears glasses        ROS:   Constitutional: Denies  fevers, chills, night sweats, fatigue or weight loss  Eyes: Denies vision loss, pain, drainage, double vision  Ears, Nose, Throat: Denies earache, tinnitus, hoarseness  Cardiovascular: Denies chest pain, tightness, palpitations  Respiratory: See HPI  Sleep: See HPI  GI: Denies abdominal pain, nausea, vomiting, diarrhea  : Denies frequent urination, hematuria, painful urination  Musculoskeletal: Denies back pain, painful joints, sore muscles  Neurological: Denies headaches, seizures.  He has a tremor and has Parkinson's disease  Skin: Denies rashes, color changes  Psychiatric: Denies depression or thoughts of suicide  Hematologic: Denies bleeding tendency or clotting tendency  Allergic/Immunologic: Denies rhinitis, skin sensitivity    Social History     Social History   • Marital status:      Spouse name: N/A   • Number of children: N/A   • Years of education: N/A     Occupational History   • Not on file.     Social History Main Topics   • Smoking status: Never Smoker   • Smokeless tobacco: Never Used   • Alcohol use Yes      Comment: very very rarely   • Drug use: No   • Sexual activity: Not on file     Other Topics Concern   • Not on file     Social History Narrative   • No narrative on file     Patient has no known allergies.  Current Outpatient Prescriptions on File Prior to Visit   Medication Sig Dispense Refill   • Ropinirole HCl 2 MG TABLET SR 24 HR Take 3 Tabs by mouth every day. 270 Tab 0   • atorvastatin (LIPITOR) 20 MG Tab Take 1 Tab by mouth every day. 90 Tab 3   • losartan (COZAAR) 50 MG Tab Take 1 Tab by mouth every day. 90 Tab 3   • carbidopa-levodopa (SINEMET)  MG Tab Take 1 Tab by mouth 2 times a day. Take at 7AM and 4PM 60 Tab 4   • esomeprazole (NEXIUM) 40 MG delayed-release capsule      • latanoprost (XALATAN) 0.005 % Solution      • aspirin (ASA) 81 MG Chew Tab chewable tablet Take 81 mg by mouth every day.     • Cholecalciferol 4000 units Cap Take  by mouth.     • raNITidine  "(ZANTAC) 150 MG Tab Take 1 Tab by mouth 2 times a day. (Patient not taking: Reported on 12/28/2018) 60 Tab 11   • AZOPT 1 % Suspension        No current facility-administered medications on file prior to visit.      Blood pressure 130/80, pulse 76, temperature 37.2 °C (99 °F), temperature source Oral, resp. rate 16, height 1.651 m (5' 5\"), weight 72.6 kg (160 lb), SpO2 98 %.  Family History   Problem Relation Age of Onset   • Lung Disease Father    • Diabetes Paternal Grandfather        Physical Exam:  No distress at rest on room air.  He does have a resting tremor.  He has a paucity of facial expression.  HEENT: PERRLA, EOMI, no scleral icterus, no nasal or oral lesions  Neck: No thyromegaly, no adenopathy, no bruits  Mallampatti: Grade III  Lungs: Equal breath sounds, no wheezes or crackles  Heart: Regular rate and rhythm, no gallops or murmurs  Abdomen: Soft, benign, no organomegaly  Extremities: No clubbing, cyanosis, or edema  Neurologic: Tremor    1. Parkinson disease (HCC)    2. GENIA (obstructive sleep apnea)    3. ILD (interstitial lung disease) ([facility])      This man has a history of interstitial lung disease of uncertain etiology.  The presence of Parkinson's disease brings up the possibility of chronic aspiration.  Of course he may have UIP.    We will send for the images of his CT scan from [place].    I did suggest a repeat evaluation with a CT scan, pulmonary function testing, and a speech therapy evaluation.  The patient questions whether or not testing is really necessary.  We will await review of the imaging before we decide on any further evaluation.  We encourage the use of his CPAP as frequently as possible.  Overall he tends to want to be less aggressive in terms of testing and therapy.    "

## 2019-01-11 ENCOUNTER — SPEECH THERAPY (OUTPATIENT)
Dept: SPEECH THERAPY | Facility: REHABILITATION | Age: 79
End: 2019-01-11
Attending: INTERNAL MEDICINE
Payer: MEDICARE

## 2019-01-11 DIAGNOSIS — R13.12 OROPHARYNGEAL DYSPHAGIA: ICD-10-CM

## 2019-01-11 DIAGNOSIS — G20.A1 PARKINSON DISEASE: ICD-10-CM

## 2019-01-11 DIAGNOSIS — R47.1 DYSARTHRIA: ICD-10-CM

## 2019-01-11 PROCEDURE — 92522 EVALUATE SPEECH PRODUCTION: CPT

## 2019-01-11 PROCEDURE — 92610 EVALUATE SWALLOWING FUNCTION: CPT

## 2019-01-11 ASSESSMENT — ENCOUNTER SYMPTOMS: NO PATIENT REPORTED PAIN: 1

## 2019-01-11 NOTE — OP THERAPY EVALUATION
"  Outpatient Speech Therapy  INITIAL EVALUATION    [facility]  [address]  [address]  [facility] [address]  Phone:  [phone]  Fax:  [phone]    Date of Evaluation: 01/11/2019    Patient: Michael Packer  YOB: 1940  MRN: [id number]     Referring Provider: Henrry Carranza M.D.  [address]  [address]  [place] [address]   Referring Diagnosis Parkinson disease ([facility]) [G20];ILD (interstitial lung disease) ([facility]) [J84.9]     Time Calculation  Start time: 1300  Stop time: 1400 Time Calculation (min): 60 minutes     Speech Therapy Occurrence Codes    Date of Onset of Impairment:  12/28/18   Date speech therapy care plan established or reviewed:  1/11/19   Date speech therapy treatment started:  1/11/19          Chief Complaint: Other (Parkinson's disease)    Visit Diagnoses     ICD-10-CM   1. Parkinson disease ([facility]) G20   2. Oropharyngeal dysphagia R13.12   3. Dysarthria R47.1     Subjective:   Reason for Therapy:     Reason For Evaluation:  Dysarthria and Dysphagia (Parkinson's disease)    Onset Date:  12/28/2018    Onset Description:  Patient 78 year old male with history of Parkinson's disease s/p 4 years from diagnosis re-establishing care with providers following move from [facility]. Patient referred by Pulmonology to rule out aspiration secondary to long standing history of cough with history of interstitial lung disease of uncertain etiology.  Patient denies changes to swallow but acknowledges changes to speech stating, \"sometimes I have a hard time getting out what I want to say.\"   Social Support:     Accompanied By:  Spouse (Wife present and supportive)    Patient Mental Status:  Alert and Responsive  Progress Factors:     Progression:  Getting worse  Pain:     no pain reported    Therapy History:     Current Diet:  Normal Consistency and Thin Liquids    Nutritional Concerns Restrictions and Allergies:  Patient wife reports that patient appetite " "\"has finally returned\" since moving to [place]. Patient is reported to eat \"a lot of sweets\" but continues to eat 3 meals a day of regular solids, thin liquids.     Hearing:  Hard of hearing (no aids/ )    Vision:  Eye Glasses    Dentition:  Complete Dentition    Handedness:  Right-handed  Additional Subjective Comments:      Patient is reported to \"have a cane that sits in the closet or the trunk of the car.\"  Patient presented with masked facial expression, flat affect and low, mumbled speech production.     Past Medical History:   Diagnosis Date   • Chickenpox    • Daytime sleepiness    • GERD (gastroesophageal reflux disease)    • Glaucoma    • Heartburn    • Hyperlipidemia    • Hypertension    • Kidney stone    • Mumps    • Muscle disorder    • Sleep apnea    • Wears glasses      Past Surgical History:   Procedure Laterality Date   • ARTHROSCOPY, KNEE       Objective:   Treatments/Interventions Performed:  Speech/Language treatment, Dysphagia treatment, Patient/Caregiver education and Compensatory strategy training  Other Treatment Interventions:  Frenchay Dysarthria Assessment 2 (FDA 2); Clinical bedside swallow assessment  Treatment Intervention tool(s) used:  The Frenchay Dysarthria Assessment-2 (FDA-2) was administered to assess patient performance on a range of behavior(s) related to speech function.  The test is divided into seven section(s):  (a) Reflexes; (b) Respiration; (c) Lips; (d) Palate; (e) Laryngeal; (f) Tongue; and (g) Intelligibility.  Influencing factors are also considered.     Patient achieved the following results:  (a) Reflexes: a-b Normal function-MINIMAL; (b) Respiration: c MODERATE; (c) Lips: b MILD; (d) Palate: a-b Normal function-MINIMAL; (e) Laryngeal: b-c MODERATE; (f) Tongue: b-d MODERATE TO SEVERE; and (g) Intelligibility % accuracy for novel phrases/ simple sentences as deemed by examiner.   Objective Details:  Based on results of oral motor function through administration of " "the French Dysarthria Assessment -2 (FDA-2), patient presented with a MODERATE hypokinetic dysarthria consistent with diagnosis of Parkinson's disease. Patient presented with the following. In regards to reflex, patient reports occasional difficulty with throat clearing but was unable to elaborate.  Patient respiration was noted to demonstrate shallow breaths at rest with deep inhalations requiring multiple trials and increased effort.  Patient with right side facial weakness with reduced lip closure to right labial corner.  Palate movement was present and intact with intermittent nasality during connected speech.  Patient was independent in sustained phonation /ah/ to 7 seconds with fair voicing with voice highly unpredictable and best characterized as low in volume, poor tone with limited pitch variability.  Tongue demonstrated good coordination; however, strength was deemed moderately reduced.     Speech Therapy Assessment:     Speech Mechanism Assessment:     Portions of the Other (Frenchay Dysarthria Assessment 2) are used.    Patient voice description: Hoarse (warble to vocal tone with reduced intensity)    Laterality of patient facial weakness: Right (slight; slight right side labial droop at rest)    Patient's oral movements are voluntary and coordination: Minimal    Patient speaks fluently: WFL    Patient exhibits articulatory precision: Moderate (mumbled, slurred speech patterns at phrase, simple sentence level impacted by limited oral opening and reduced accuracy of oral motor movement)    Patient exhibits single word intelligibility: Minimal    Patient exhibits sentence level intelligibility: Minimal    Patient dysarthria: Moderate    Nasality is minimal    Patient uses adequate breath support: No    Patient breath rate: Slow  Speech mechanism comments: Patient wife reports that patient \"can be difficult to understanding\" and that \"other people tell me they cannot understanding what he is saying.\"  " Patient presented with voice characterized by low intensity, fair quality with overall intelligibility impacted by limited articulatory precision during [place] resulting in slurred, mumbled speech quality.     Oral Motor Status:      Portions of the Other (Frenchay Dysarthria Assessment 2 (FDA 2)) are used.    Labial strength and control for patient: Fair    Lingual strength and control for patient: Fair    Patient saliva management: GoodPatient oral sensation and awareness: Poor    Patient awareness of swallow problem: Poor    Oral motor status comments: Oral motor structure and function formally assessed with deficits noted in all areas including lips, tongue, and laryngeal.  Skilled observation noted presence of jaw quiver at rest increased all oral motor tasks and increasingly during mastication of solids.      Oral Phase Assessment:     Portions of the Other (Clinical evaluation of swallow) are used.    Types of food with increased mastication: Mechanical Soft and Regular    Patient stasis and residue: Puree, Mechanical Soft and Regular    Laterality of patient's difficulty chewing: Bilateral (teeth grinding observed with mechanical soft, regular solid textures)    Liquid wash to clear residue: Puree, Mechanical Soft and Regular    Oral phase comments: Patient participated in clinical evaluation of swallow with patient presented with the following textures: Thin liquid water, puree (pudding), mechanical soft (soft, canned fruit), and regular (dry cracker).  Patient with noted difficulty in controlling intentional movement during intake resulting in increased impulsivity with liquids resulting in patient completing large volume, successive swallow thin liquids requiring a minimum of 2 swallows immediate, one delayed swallow in order to clear.  Poor oral sensation with solids resulted in large volume (heaping teaspoon) puree solids resulted in build up of puree to tongue to multiple trials requiring clinician to  direct patient to swallow.   Patient with need for verbal cues to control all intake of solids with difficulty in rotary chew and ability to orally manage solids reduced as difficulty of solid increased. Patient with moderate oral residue following double swallow with tongue sweeps with mechanical soft; severe oral residue increased to base of tone following double-triple swallow with tongue sweeps with regular solids.  Use of liquid wash was largely unsuccessful in clearing oral residue.     Pharyngeal Phase Assessment:     Laryngeal    Reduced laryngeal elevation: Puree, Mechanical Soft, Regular and Thin Liquid    Coughing after the swallow present: Regular    Delayed coughing seconds (Regular): 4 seconds.    Pharyngeal phase comments: Patient presented with presence of delayed cough following regular solid trials. Patient with reduced laryngeal prep/elevation and excursion to all texture trialed.  Patient presented with presence of minimal throat clear following trial suggesting increased pharyngeal build up with multiple swallows with poor ability to demonstrate laryngeal elevation/ pharyngeal constriction to clear.     Speech Therapy Plan :   Prognosis & Recommendations  Impression Summary:  Based on the results of today's assessment through administration of formal assessment and clinical bedside swallow evaluation, patient presented with a moderate dysarthria and suggested moderate oropharyngeal swallowing deficits increasing patient risk of aspiration/ penetration and limiting patient independence in communication secondary to Parkinson's disease.  Patient would likely benefit from participation in direct, outpatient speech therapy services addressing deficits to educate/ train patient and wife regarding Parkinson's disease and impact on speech and swallow and provide instruction to improve safety of swallow and accuracy in speech production skills.   Prognosis:  Good  Compensatory swallow strategies:   Upright position 90 degrees during meal and 45 minutes after meal, Reduce bolus size, Alternate liquids/solids, No talking while eating, Multiple swallows and Cough/clear wet vocal quality after swallow  Diet Recommendation:  Mechanical Soft Foods  Liquid Recommendation:  Thin Liquid  Goals  Short Term Goals:  1. Patient will complete OMEX targeting:  Lips, tongue, laryngeal coordination/ strength for speech production and swallow 95% accuracy independent as evidenced by FDA 2 score of a-b in all areas.  2. Patient will increase intelligibility through stating and using Think Loud- Think Shout speech production strategy to increase accuracy in word productions progressing to simple phrases 95% accuracy independent.  3. Patient will state and use compensatory swallow strategies to improve safety in swallow of mechanical soft solids, progressing to regular solids during therapist directed trials demonstrating no overt signs or symptoms of aspiration 90% accuracy.   Short Term Goal Duration (Weeks):  2-4 weeks  Long Term Goals:  1. Patient will use compensatory speech strategies to increase accuracy in speech production for intelligibility rating of 100% accuracy with familiar and unfamiliar listeners to independent level.  2.  Patient will demonstrate safety in consumption of regular solids, thin liquids using compensatory swallow strategies modified independent demonstrating no overt signs or symptoms of aspiration for primary nutrition needs.   Long Term Goal Duration (Weeks):  1-2 months  Therapy Recommendations  Recommendation:  Individual Speech Therapy, Dysphagia Treatment, Modified Barium Swallow Study and Motor Speech Treatment,  Planned Therapy Interventions:  Home Program, Patient/Caregiver Education, Compensatory Strategy Training, Respiratory coordination/support training, Speech/Language training, Voice training, Dysphagia treatment, NMES (VitalStim) and Thermal/Tactile Stimulation,   Plan Details:  16  units (29149); 16 units (92527)  Frequency:  2x week  Duration (in weeks):  8            Referring provider co-signature:  I have reviewed this plan of care and my co-signature certifies the need for services.  Certification Dates:   From 1/11.2019     To 3/8.2019    Physician Signature: ________________________________ Date: ______________

## 2019-01-15 ENCOUNTER — SPEECH THERAPY (OUTPATIENT)
Dept: SPEECH THERAPY | Facility: REHABILITATION | Age: 79
End: 2019-01-15
Attending: INTERNAL MEDICINE
Payer: MEDICARE

## 2019-01-15 DIAGNOSIS — G20.A1 PARKINSON DISEASE: ICD-10-CM

## 2019-01-15 DIAGNOSIS — R13.12 OROPHARYNGEAL DYSPHAGIA: ICD-10-CM

## 2019-01-15 PROCEDURE — 92526 ORAL FUNCTION THERAPY: CPT

## 2019-01-15 NOTE — OP THERAPY DAILY TREATMENT
"  Outpatient Speech Therapy  DAILY TREATMENT     [facility] [address]  [address]  [place] [address]  Phone:  [phone]  Fax:  [phone]    Date: 01/15/2019    Patient: Michael Packer  YOB: 1940  MRN: [id number]     Time Calculation  Start time: 1300  Stop time: 1330 Time Calculation (min): 30 minutes     Chief Complaint: Other (changes to speech/ swallow secondary to dx of Parkinson's disease)    Visit #: 2    Subjective:   Reason for Therapy:     Reason For Evaluation:  Dysphagia (Oropharyngeal)  Social Support:     Accompanied By:  Spouse (wife present and supportive)    Patient Mental Status:  Alert and Responsive  Additional Subjective Comments:      Patient/ wife stated they \"started playing games\" since initial visit with speech therapy including games such as \"checkers.\"  Patient and wife stated thankfulness regarding ST education, recommendations and participation in speech therapy addressing changes to patient speech and swallow function secondary to Parkinson's disease.  Patient reported that he \"feels a little unsteady on my feet\" with patient wife reporting that patient with previous recommendations for use of cane with ambulation.  Patient was not noted to use any assisted device during ambulation through therapy clinic this day.       Objective:   Treatments/Interventions Performed:  Dysphagia treatment, Patient/Caregiver education and Compensatory strategy training  Treatment Intervention tool(s) used:  Plan of care initiated with patient/ patient wife with goal setting and expectations for speech therapy educated.  Patient/ wife acknowledged understanding to all education provided.          Speech Therapy Assessment:     Speech Mechanism Assessment:     Patient voice description: Hoarse (slightly hoarse, improved clarity; independent attempts to improve volume noted throughout session)    Oral Motor Status:     Labial strength and control " "for patient: Fair    Lingual strength and control for patient: Fair    Patient saliva management: GoodPatient oral sensation and awareness: Fair    Patient awareness of swallow problem: Fair    Oral motor status comments: OMEX initiated addressing:  Lips and tongue.  Patient completed with fair coordination/ strength observed.  Patient noted to have increased presence of intentional tremor during structured exercise.  Mendelsohn maneuver introduced with saliva swallows to increase patient awareness of swallow function and address laryngeal elevation during swallow.  Patient unable to maintain laryngeal lift all trials.  Mendelsohn recommended as part of home program.     Oral Phase Assessment:     Oral phase comments: Patient participation in therapist directed trials: thin liquid water via cup swallows.  Goal was to control patient volume during swallows through use of small, single sips with implementation of complete oral closure (bite + swallow) prior to initiation of pharyngeal swallow.  Patient completed to direct verbal cues demonstrating improved oral bolus management of liquids when using small, controlled sips.      Pharyngeal Phase Assessment:     Pharyngeal phase comments: Patient participated in therapist directed trial of thin liquid water cup swallows with focus of pharyngeal phase of swallow on use of effortful \"strong\" swallows. Patient completed to direct verbal instruction with 90% or greater accuracy.  Patient with noted double swallows independent to address suspected reduced laryngeal elevation/ pharyngeal constriction during swallow.  Patient with no overt signs or symptoms of aspiration as evidenced by no episode of cough/ throat clear all trials.  Use of small sips/ single sips and effortful swallow with thin liquids reinforced for home program.       Speech Therapy Plan :   Therapy Recommendations  Recommendation: Individual Speech Therapy,  Planned Therapy Interventions:  Home Program, " Patient/Caregiver Education, Compensatory Strategy Training, Dysphagia treatment, Speech/Language training, Thermal/Tactile Stimulation and NMES (VitalStim),   Plan Details:  Continue with POC.  OMEX addressing lips/ tongue, Mendelsohn maneuver and effortful swallow with thin liquids reinforced as part of home program.

## 2019-01-17 ENCOUNTER — SPEECH THERAPY (OUTPATIENT)
Dept: SPEECH THERAPY | Facility: REHABILITATION | Age: 79
End: 2019-01-17
Attending: INTERNAL MEDICINE
Payer: MEDICARE

## 2019-01-17 DIAGNOSIS — G20.A1 PARKINSON DISEASE: ICD-10-CM

## 2019-01-17 DIAGNOSIS — R47.1 DYSARTHRIA: ICD-10-CM

## 2019-01-17 PROCEDURE — 92507 TX SP LANG VOICE COMM INDIV: CPT

## 2019-01-18 NOTE — OP THERAPY DAILY TREATMENT
"  Outpatient Speech Therapy  DAILY TREATMENT     [facility] Speech [address].  [address]  [place] [address]  Phone:  [phone]  Fax:  [phone]    Date: 01/17/2019    Patient: Michael Packer  YOB: 1940  MRN: [id number]     Time Calculation  Start time: 1300  Stop time: 1330 Time Calculation (min): 30 minutes     Chief Complaint: Poor Speech    Visit #: 3    Subjective:   Reason for Therapy:     Reason For Evaluation:  Dysarthria and Voice  Social Support:     Accompanied By:  Spouse (wife present and supportive)    Patient Mental Status:  Alert and Responsive  Additional Subjective Comments:      Patient and wife report \"I'm doing my exercises 7 minutes a day.\"        Objective:   Treatments/Interventions Performed:  Patient/Caregiver education, Home program, Compensatory strategy training, Voice training and Speech/Language treatment         Speech Therapy Assessment:     Speech Mechanism Assessment:     Patient voice description: Clear (Reduced intensity)    Patient exhibits articulatory precision: Moderate    Patient dysarthria: Moderate    Patient uses adequate breath support: No    Patient breath rate: Slow  Speech mechanism comments: 1-10 loudness scale (whisper-shout) introduced to provide patient with visual feedback regarding voice specific to loudness appropriate to situation.  Patient verbalized understanding of scale provided; however, patient required direct verbal cues to increase intensity beyond level 3.  Using direct visual support, patient able to vary vocal loudness to 5 levels given structured, guided practice.  Loudness scale provided for use at home with loudness exercises implemented as part of home program.       Speech Therapy Plan :   Therapy Recommendations  Recommendation: Individual Speech Therapy,  Planned Therapy Interventions:  Home Program, Patient/Caregiver Education, Compensatory Strategy Training, Voice training, Dysphagia " treatment, Respiratory coordination/support training, Thermal/Tactile Stimulation and NMES (VitalStim),   Plan Details:  Continue POC.  Add loudness exercise with visual reinforcement to increase understanding of appropriate loudness per situation.  Continue with dysphagia exercise previously introduced as part of home program.

## 2019-01-21 ENCOUNTER — SPEECH THERAPY (OUTPATIENT)
Dept: SPEECH THERAPY | Facility: REHABILITATION | Age: 79
End: 2019-01-21
Attending: INTERNAL MEDICINE
Payer: MEDICARE

## 2019-01-21 DIAGNOSIS — R47.1 DYSARTHRIA: ICD-10-CM

## 2019-01-21 DIAGNOSIS — R13.12 OROPHARYNGEAL DYSPHAGIA: ICD-10-CM

## 2019-01-21 DIAGNOSIS — R26.89 BALANCE PROBLEM: ICD-10-CM

## 2019-01-21 PROCEDURE — 92526 ORAL FUNCTION THERAPY: CPT

## 2019-01-21 PROCEDURE — 92507 TX SP LANG VOICE COMM INDIV: CPT

## 2019-01-21 NOTE — OP THERAPY DAILY TREATMENT
"  Outpatient Speech Therapy  DAILY TREATMENT     [facility] Speech [address].  [address]  [place] [address]  Phone:  [phone]  Fax:  [phone]    Date: 01/21/2019    Patient: Michael Packer  YOB: 1940  MRN: [id number]     Time Calculation  Start time: 1000  Stop time: 1031 Time Calculation (min): 31 minutes     Chief Complaint: Difficulty Swallowing and Poor Speech    Visit #: 4    Subjective:   Reason for Therapy:     Reason For Evaluation:  Dysarthria, Voice and Dysphagia  Social Support:     Accompanied By:  Spouse (wife present and supportive)    Patient Mental Status:  Responsive and Alert  Progress Factors:     Progression:  Getting Better  Additional Subjective Comments:      Patient and wife report improved use of voice using previously educated/ trained 10 point scale.  Patient reports continued compliance with home program completing \"every day for 7 minutes.\"       Objective:   Treatments/Interventions Performed:  Speech/Language treatment, Home program, Patient/Caregiver education and Dysphagia treatment         Speech Therapy Assessment:     Speech Mechanism Assessment:     Patient voice description: Clear (Improved clarity of voice noted with improved vocal intensity )    Patient uses adequate breath support: No (Diaphragmatic breathing reinforced)  Speech mechanism comments: Patient continues to demonstrate poor coordination of breath-speech resulting in voice fade/ loss of vocal intensity for phrase level speech productions.  Patient did present with improved voicing overall during today's session positively impacting patient intelligibility.  Patient continues to require use of direct visual support and verbal cues for loudness to \"talking\" level as denoted on 1-10 voice chart.     Oral Motor Status:     Labial strength and control for patient: Good    Lingual strength and control for patient: Fair    Patient saliva management: GoodPatient oral " sensation and awareness: Good    Patient awareness of swallow problem: Good    Oral motor status comments: OMEX addressing tongue coordination and strength addressed.  Patient with marked improvement in strength/ coordination during lateralization tasks; continued need for strengthening exercises addressing protrusion and elevation. Tongue demonstrated marked improvement during Corina.     Pharyngeal Phase Assessment:     Pharyngeal phase comments: Patient provided with education regarding compensatory swallow strategies to improve safety in swallow.  Patient provided with direct education regarding effortful swallow with patient completing to thin liquid swallows tolerating with no overt signs or symptoms of aspiration/ penetration.  Patient provided with handout to promote carryover in use of compensatory swallow strategies in home environment.       Speech Therapy Plan :   Therapy Recommendations  Recommendation: Individual Speech Therapy,  Planned Therapy Interventions:  Home Program, Patient/Caregiver Education, Compensatory Strategy Training, Dysphagia treatment, Speech/Language training, Respiratory coordination/support training, Voice training, Thermal/Tactile Stimulation and NMES (VitalStim),   Plan Details:  Continue POC.  Continue home exercise program.

## 2019-01-24 ENCOUNTER — HOSPITAL ENCOUNTER (OUTPATIENT)
Dept: RADIOLOGY | Facility: MEDICAL CENTER | Age: 79
End: 2019-01-24

## 2019-01-24 ENCOUNTER — PHYSICAL THERAPY (OUTPATIENT)
Dept: PHYSICAL THERAPY | Facility: REHABILITATION | Age: 79
End: 2019-01-24
Attending: FAMILY MEDICINE
Payer: MEDICARE

## 2019-01-24 DIAGNOSIS — R26.89 BALANCE PROBLEM: ICD-10-CM

## 2019-01-24 PROCEDURE — 97162 PT EVAL MOD COMPLEX 30 MIN: CPT

## 2019-01-24 ASSESSMENT — ENCOUNTER SYMPTOMS
PAIN LOCATION: L SHOULDER
QUALITY: ACHING
PAIN TIMING: INTERMITTENT

## 2019-01-24 ASSESSMENT — ACTIVITIES OF DAILY LIVING (ADL): POOR_BALANCE: 1

## 2019-01-24 NOTE — OP THERAPY EVALUATION
Outpatient Physical Therapy  INITIAL NEUROLOGICAL EVALUATION    [facility] Physical Therapy [address]  [address]  [place] [address]  Phone:  [phone]  Fax:  [phone]    Date of Evaluation: 01/24/2019    Patient: Michael Packer  YOB: 1940  MRN: [id number]     Referring Provider: Edwar Beal M.D.  [address] [place] [address] [address]  [place] [address]   Referring Diagnosis Balance problem [R26.89]     Time Calculation  Start time: 0900  Stop time: 1000 Time Calculation (min): 60 minutes     Physical Therapy Occurrence Codes    Date of onset of impairment:  1/24/15   Date physical therapy care plan established or reviewed:  1/24/19   Date physical therapy treatment started:  1/24/19          Chief Complaint: Loss Of Balance    Visit Diagnoses     ICD-10-CM   1. Balance problem R26.89       Subjective:   History of Present Illness:     Date of onset:  1/24/2015    Mechanism of injury:  Patient is a 78 year old male with a complex medical history of Parkinsons (1/24/15), GENIA, Glaucoma, HTN, HLD, hearing impairments and GERD. Presents to therapy with wife; has c/o balance problems with LOB in the home during ambulation, difficulty with initiating movement, and difficulty with sit to stand. Patient is a poor historian and denies falls but wife reported two falls; most recent was 3 years ago. Patient has been diagnosed with interstitial lung disease but declined supplemental O2 in the past. Prescribed Sinemet: 3x/day; takes everyday. Morning, lunch and dinner. Took medication this morning at 7:30/8 am this morning; next dose will be at noon. Endorses hallucinations due to the medications. Denies diskinesias.             Sleep disturbance:  Not disrupted (Patient has been diagnosed with obstructive sleep apnea. Pt uses CPAP in the afternoon but not in the evening to sleep. )  Pain:     Location:  L shoulder     Quality:  Aching (Hx of shoulder bursitis)    Pain timing:   "Intermittent  Social Support:     Patient lives at: mobile home with 4-5 steps (handrail both sides)    Lives with:  Spouse  Hand dominance:  Right  Treatments:     Previous treatment:  Physical therapy (Saw physical therapist (Estefany) in [place] in June/July for AD training (cane).)    Treatments in progress: Has ST (initiated 1/11/19).  Activities of Daily Living:     Patient reported ADL status: Patient reports requiring additional time for dressing/ buttoning shirts, difficulty with dexterity, standing up from chairs, fatigue, and balance. Patient is able to walk 20 min before requiring rest break. Patient reports he is supposed to use a cane per doctor to use a cane but \"always forgets it.\"     Patient has current DME: cane, grab bars; will get a tub conversion in the near future.     Pt is still driving. Reports problems with parking but has seen improvements with medication. Pt performs all ADLs and medications. Pt's wife handles the finances at home.        Patient Goals:     Patient goals for therapy:  Improved balance      Past Medical History:   Diagnosis Date   • Chickenpox    • Daytime sleepiness    • GERD (gastroesophageal reflux disease)    • Glaucoma    • Heartburn    • Hyperlipidemia    • Hypertension    • Kidney stone    • Mumps    • Muscle disorder    • Sleep apnea    • Wears glasses      Past Surgical History:   Procedure Laterality Date   • ARTHROSCOPY, KNEE       Social History   Substance Use Topics   • Smoking status: Never Smoker   • Smokeless tobacco: Never Used   • Alcohol use Yes      Comment: very very rarely     Family and Occupational History     Social History   • Marital status:      Spouse name: N/A   • Number of children: N/A   • Years of education: N/A       Objective:   Active Range of Motion:   Upper extremity (left):     All left upper extremity active range of motion: All within functional limits  Upper extremity (right):     All right upper extremity active range of " motion: All within functional limits  Lower extremity (left):     All left lower extremity active range of motion: All within functional limits    AROM Left Knee Extension: Tightness noted by pt.  Lower extremity (right):     All right lower extremity active range of motion: All within functional limits      Strength:   Lower extremity (left):     Hip flexion: 4+    Left hip extension strength: WFL.    Knee extension: 4  Lower extremity (right):     Hip flexion: 4+    Right hip extension strength: WFL.    Knee extension: 4+    Strength Comments:  5x sit to stand; no use of UE's but pushed LE's against mat    Mini-BESTest score total: 20/26 (Dual task TUG NT this session)  Anticipatory: 4/6  Reactive postural control: 5/6  Sensory orientation: 6/6  Dynamic gait: 5/8 (Dynamic TUG taken out of score total)    O2 Saturation:  96-98 at rest (HR: 86 bpm)  98 after Mini-BESTest      Tone, Sensation and Coordination:     Sensation   Upper extremity (left):     Light touch: Intact  Upper extremity (right):     Light touch: Intact  Lower extremity (left):     Light touch: Intact  Lower extremity (right):     Light touch: Intact    Coordination   Upper extremity (left):     Fine motor: Impaired  Upper extremity (right):     Fine motor: Impaired  Lower extremity (left):     Slow alternating movements: Within functional limits  Lower extremity (right):     Slow alternating movements: Within functional limits      Coordination comments:   Presents with bradykinetic movements during fine motor dexterity    Cognition:     Orientation: normal to time, normal to place, normal to person and normal to situation    Hearing: impaired    Vision/Perception:     Visual tracking: impaired (Bradykinetic eye movements)    Convergence: impaired      Exercises/Treatment  Time-based treatments/modalities:          Assessment, Response and Plan:   Impairments: activity intolerance, fine motor function and impaired balance    Assessment details:   Patient is a 78 year old male with a complex medical history of Parkinson's (1/24/15), GENIA, Glaucoma, HTN, HLD, hearing impairments and GERD presents to therapy with c/o balance problems.Patient would benefit from skilled physical therapy to address the above impairments to improve participation in ADL's, improve safety, and improve QOL.   Barriers to therapy:  Financial, hearing and vision (Is also seeing ST simultaneously)  Prognosis: good    Goals:   Short Term Goals:   1. Patient will be independent with HEP  Short term goal time span:  2-4 weeks      Long Term Goals:    1. Patient will be independent with HEP maintenance program.  2. Patient will receive a Mini-BESTest score of 24/26 excluding the dynamic TUG portion.   3. Patient will walk 25 minutes or greater withleast restrictive AD without having to rest due to fatigue.   4. Patient will perform one step for recovery in backward and lateral compensatory stepping corrections to improve balance.   Long term goal time span:  6-8 weeks    Plan:   Therapy options:  Physical therapy treatment to continue  Planned therapy interventions:  E Stim Unattended (CPT 80998), Gait Training (CPT 86894), Manual Therapy (CPT 97997), Neuromuscular Re-education (CPT 81036), Therapeutic Activities (CPT 05135) and Therapeutic Exercise (CPT 35972)  Frequency:  1x week  Duration in weeks:  8  Discussed with:  Patient (and spouse)  Plan details:  Pt will be provided with additional educational resources regarding diagnosis.    UPOC: 3/21/19    Referring provider co-signature:  I have reviewed this plan of care and my co-signature certifies the need for services.  Certification Dates:   From:  1/24/19   To: 3/21/19    Brady Espinoza, Student  I have directly supervised the entirety of this treatment/evaluation and agree with the treatment/evaluation provided.     Anne Marie Lyles, PT, DPT   Physician Signature: ________________________________ Date: ______________

## 2019-01-25 ENCOUNTER — SPEECH THERAPY (OUTPATIENT)
Dept: SPEECH THERAPY | Facility: REHABILITATION | Age: 79
End: 2019-01-25
Attending: INTERNAL MEDICINE
Payer: MEDICARE

## 2019-01-25 DIAGNOSIS — R13.12 OROPHARYNGEAL DYSPHAGIA: ICD-10-CM

## 2019-01-25 DIAGNOSIS — R47.1 DYSARTHRIA: ICD-10-CM

## 2019-01-25 PROCEDURE — 92526 ORAL FUNCTION THERAPY: CPT

## 2019-01-25 PROCEDURE — 92507 TX SP LANG VOICE COMM INDIV: CPT

## 2019-01-25 NOTE — OP THERAPY DAILY TREATMENT
"  Outpatient Speech Therapy  DAILY TREATMENT     [facility] Speech [address].  [address]  [place] [address]  Phone:  [phone]  Fax:  [phone]    Date: 01/25/2019    Patient: Michael Packer  YOB: 1940  MRN: [id number]     Time Calculation  Start time: 1430  Stop time: 1500 Time Calculation (min): 30 minutes     Chief Complaint: Difficulty Swallowing and Poor Speech (\"I'm speaking louder I think\")    Visit #: 5    Subjective:   Reason for Therapy:     Reason For Evaluation:  Dysphagia and Dysarthria  Social Support:     Patient Mental Status:  Alert and Responsive  Additional Subjective Comments:      Patient and wife reports improvement speech characterized as \"I'm speaking louder now\" with patient also reporting improvements in swallow.        Objective:   Treatments/Interventions Performed:  Patient/Caregiver education, Compensatory strategy training, Home program, Dysphagia treatment, Voice training and Speech/Language treatment         Speech Therapy Assessment:     Speech Mechanism Assessment:     Patient voice description: Clear (Improved intensity to 4 (just below talking) level on voice chart during today's session)  Speech mechanism comments: Review of structured exercise initiated addressing vocal loudness.  Sustained phonation /ah/ and pitch elevated /i/ completed to 16 seconds.  Patient with noted improvement in independence in obtaining vocal loudness to 5 (talking level) during structured exercise.  Patient wife reports use of voice chart/ scale at home to provide patient with gentle correction regarding voice and when increased vocal loudness is necessary with good results.     Oral Motor Status:     Labial strength and control for patient: Good    Lingual strength and control for patient: Good    Oral motor status comments: OMEX completed addressing: lip/ tongue coordination and strength.  Patient with improved understanding regarding accuracy in " completion of structured exercise addressing laryngeal coordination/ strength through completion of Mendelsohn.  Patient demonstrated laryngeal lift to exercise with 20% accuracy.     Pharyngeal Phase Assessment:     Pharyngeal phase comments: Patient participated in therapist directed trial of regular solid (dry cracker) and thin liquid water via cup swallows.  Patient independent in use of compensatory swallow strategy (small bites) resulting in improved oral bolus management as evidenced by min residue present to tongue following initial swallow.  Tongue sweep/ liquid wash + second swallow resulted in complete clearing of residue all trials.  Patient with noted improvement in strength and coordination of rotary chew with regular solid cracker.       Speech Therapy Plan :   Therapy Recommendations  Recommendation: Individual Speech Therapy,  Planned Therapy Interventions:  Home Program, Patient/Caregiver Education, Compensatory Strategy Training and Dysphagia treatment,   Plan Details:  Continue with POC.  Continue with home program addressing speech, swallow.

## 2019-01-28 ENCOUNTER — SPEECH THERAPY (OUTPATIENT)
Dept: SPEECH THERAPY | Facility: REHABILITATION | Age: 79
End: 2019-01-28
Attending: INTERNAL MEDICINE
Payer: MEDICARE

## 2019-01-28 DIAGNOSIS — R47.1 DYSARTHRIA: ICD-10-CM

## 2019-01-28 DIAGNOSIS — R13.12 OROPHARYNGEAL DYSPHAGIA: ICD-10-CM

## 2019-01-28 PROCEDURE — 92526 ORAL FUNCTION THERAPY: CPT

## 2019-01-28 PROCEDURE — 92507 TX SP LANG VOICE COMM INDIV: CPT

## 2019-01-29 NOTE — OP THERAPY DAILY TREATMENT
Outpatient Speech Therapy  DAILY TREATMENT     [address]  [address]  [place] [address]  Phone:  [phone]  Fax:  [phone]    Date: 01/28/2019    Patient: Michael Packer  YOB: 1940  MRN: [id number]     Time Calculation  Start time: 1430  Stop time: 1500 Time Calculation (min): 30 minutes     Chief Complaint: No chief complaint on file.    Visit #: 6    Subjective:   Reason for Therapy:     Reason For Evaluation:  Dysarthria and Dysphagia (oropharyngeal)  Social Support:     Accompanied By:  Spouse (wife present and supportive)    Patient Mental Status:  Alert and Responsive  Progress Factors:     Progression:  Getting Better  Additional Subjective Comments:      Patient accompanied by wife to today's session.  Patient deficits in hearing present throughout today's session requiring frequent repeats and verbal acknowledgement of patient to ensure understanding.  ST encouraged patient to consider referral to Audiology for formal assessment of hearing given patient likely misses important information as a result of hearing deficits.       Objective:   Treatments/Interventions Performed:  Dysphagia treatment, Patient/Caregiver education, Compensatory strategy training and Speech/Language treatment         Speech Therapy Assessment:     Speech Mechanism Assessment:     Patient voice description: Clear (reduced intensity but improved during structured tasks with direct visual support)    Patient uses adequate breath support: No    Patient breath rate: Slow  Speech mechanism comments: Review of structured exercise initiated addressing vocal loudness.  Patient with noted improvement in independence in obtaining vocal loudness to 5 (talking level) during structured exercise completing word reading tasks to single words of increasing complexity with 95% accuracy to supervision only level.  Patient/ wife report continued use of voice chart in home  setting to improve patient understanding of adequate voice and improve use of voice for improved intelligibility with good results.     Oral Motor Status:     Labial strength and control for patient: Good    Lingual strength and control for patient: Good    Oral motor status comments: Coordination and strength of oral musculature, specifically lips and tongue, improved through completion of structured OMEX.   Mendelsohn maneuver initiated and completed with patient requiring direct cues to complete exercise:  Inability to maintain laryngeal lift during exercise noted.  Patient encouraged to continue with Mendelsohn as part of home program.       Speech Therapy Plan :   Therapy Recommendations  Recommendation: Individual Speech Therapy,  Planned Therapy Interventions:  Compensatory Strategy Training, Dysphagia treatment, Home Program, Thermal/Tactile Stimulation and Patient/Caregiver Education,   Plan Details:  Continue with POC.  Continue with home program.

## 2019-01-30 ENCOUNTER — TELEPHONE (OUTPATIENT)
Dept: PHYSICAL THERAPY | Facility: REHABILITATION | Age: 79
End: 2019-01-30

## 2019-01-30 ENCOUNTER — PHYSICAL THERAPY (OUTPATIENT)
Dept: PHYSICAL THERAPY | Facility: REHABILITATION | Age: 79
End: 2019-01-30
Attending: FAMILY MEDICINE
Payer: MEDICARE

## 2019-01-30 DIAGNOSIS — G20.A1 PARKINSON DISEASE: ICD-10-CM

## 2019-01-30 DIAGNOSIS — R26.89 BALANCE PROBLEM: ICD-10-CM

## 2019-01-30 PROCEDURE — 97112 NEUROMUSCULAR REEDUCATION: CPT

## 2019-01-30 RX ORDER — ROPINIROLE 2 MG/1
6 TABLET, FILM COATED, EXTENDED RELEASE ORAL DAILY
Qty: 270 TAB | Refills: 3 | Status: SHIPPED | OUTPATIENT
Start: 2019-01-30 | End: 2019-04-09 | Stop reason: SDUPTHER

## 2019-01-30 NOTE — OP THERAPY DISCHARGE SUMMARY
Cathy Beal,    I'm am currently seeing your patient, Michael Packer in physical therapy. Based on his complaints of difficulty with dressing and driving, he may benefit from a referral to OT to address fine motor dexterity and reaction speed. Please let me know if you have any other questions or concerns.    Brady Espinoza, SPT    Anne Marie Lyles, PT, DPT

## 2019-01-30 NOTE — OP THERAPY DAILY TREATMENT
Outpatient Physical Therapy  DAILY TREATMENT     [facility] Physical [address].  [address]  [place] [address]  Phone:  [phone]  Fax:  [phone]    Date: 01/30/2019    Patient: Michael Packer  YOB: 1940  MRN: [id number]     Time Calculation  Start time: 1030  Stop time: 1110 Time Calculation (min): 40 minutes     Chief Complaint: Loss Of Balance    Visit #: 2    SUBJECTIVE: I I feel okay today. Thank you for all the Parkinson's resources.     OBJECTIVE:         Therapeutic Exercises (CPT 39221):     1. Nu step , x 3 min    Therapeutic Treatments and Modalities:     1. Neuromuscular Re-education (CPT 08349)    Therapeutic Treatment and Modalities Summary: PWR (Parkinson's wellness recovery) style exercises focusing on big purposeful movements in sitting including:  PWR UP: Seated full body extension  PWR ROCK: Seated lateral reaching  PWR TWIST: Seated trunk twist with hand clap  PWR STEP: Seated step outs with both feet.   1 set Added to HEP  (Required rest break after three reps; Satted at 80 with no signs of distress or SOB. Satted at 87 after 5 min seated rest break. Fingers were cold to touch.     Standing hip strategy exercise at wall with chair for safety; Added to HEP.    Standing ball toss on firm surface (Gait belt with SBA)  Standing ball toss on foam surface (Gait belt with CGA); demonstrated increased sway with reduced hip strategy for recovery  Standing ball toss in tandem stance (Gait belt with CGA); demonstrated increased sway with reduced hip strategy for recovery        Time-based treatments/modalities:  Therapeutic exercise minutes (CPT 74543): 3 minutes  Neuromusc re-ed, balance, coor, post minutes (CPT 07131): 37 minutes        Pt education: Pt given resources for [employer] in the community, PWR and [employer] Big brochures, Parkinson's information packet. Given information about services provided by OT as well as Medicare allowance of visits  per calendar year.       ASSESSMENT:   Response to treatment: Continue exercises in sitting to build endurance. Progress hip strategy exercises. Patient may benefit from an OT evaluation for fine motor dexterity and reaction time to address ADL's such as dressing and driving based on previous patient complaints.     PLAN/RECOMMENDATIONS:   Plan for treatment: therapy treatment to continue next visit.  Planned interventions for next visit: continue with current treatment. Add shuttle board and 1/2 foam roll balance exercises.    Brady Espinoza, Student    I have directly supervised the entirety of this treatment/evaluation and agree with the treatment/evaluation and plan of care provided.     Anne Marie Lyles, PT, DPT

## 2019-02-04 ENCOUNTER — SPEECH THERAPY (OUTPATIENT)
Dept: SPEECH THERAPY | Facility: REHABILITATION | Age: 79
End: 2019-02-04
Attending: INTERNAL MEDICINE
Payer: MEDICARE

## 2019-02-04 DIAGNOSIS — R47.1 DYSARTHRIA: ICD-10-CM

## 2019-02-04 PROCEDURE — 92507 TX SP LANG VOICE COMM INDIV: CPT

## 2019-02-04 NOTE — OP THERAPY DAILY TREATMENT
"  Outpatient Speech Therapy  DAILY TREATMENT     [facility] Speech [address]  [address]  [place] [address]  Phone:  [phone]  Fax:  [phone]    Date: 02/04/2019    Patient: Michael Packer  YOB: 1940  MRN: [id number]     Time Calculation  Start time: 1007  Stop time: 1035 Time Calculation (min): 28 minutes     Chief Complaint: Poor Speech    Visit #: 7    Subjective:   Reason for Therapy:     Reason For Evaluation:  Dysarthria  Social Support:     Accompanied By:  Spouse (wife present and supportive)    Patient Mental Status:  Alert and Responsive  Progress Factors:     Progression:  Getting Better  Additional Subjective Comments:      Patient/ wife report improved loudness in home setting with use of direct visual support (loudness scale) and verbal prompts from wife as necessary to increase loudness \"speak at a 5 (talking level).\"  Patient presented with speech therapy with improved voicing this day independent to 4-5 talking level loudness during structured speech production tasks.       Objective:   Treatments/Interventions Performed:  Speech/Language treatment, Compensatory strategy training and Patient/Caregiver education         Speech Therapy Assessment:     Speech Mechanism Assessment:     Patient voice description: Clear (Improved independence in use of appropriate vocal intensity)    Patient dysarthria: Minimal    Patient uses adequate breath support: Yes (breath support improving during structured speech production tasks)  Speech mechanism comments: Vocal loudness targeted through completion of structured exercise with focus on respiration.  Diaphragmatic breathing education/ training continued during structured exercise with patient completing even inhalation/ exhalation to count of 5 with exhalation on /h/.  Respiration training continued with exhalation to voiceless consonants /s/, /f/, \"th\" and \"sh\" with patient completing to a minimum of 7 " seconds with consistent exhalation observed.  Counting on single breath maintaining loudness addressed with patient independent in slow, controlled speech to appropriate loudness with counting to 10, 90% accuracy given verbal prompts/ instruction/ cues as necessary.  Use of appropriate volume to context addressed:  Soft/ loud speech completed to simple phrase level during structured task 100% accuracy.  Exercises to implement as part of home exercise program educated/ trained.       Speech Therapy Plan :   Therapy Recommendations  Recommendation:  Individual Speech Therapy and Motor Speech Treatment,  Planned Therapy Interventions:  Dysphagia treatment, Patient/Caregiver Education, Home Program, Voice training, Respiratory coordination/support training, Compensatory Strategy Training and Thermal/Tactile Stimulation,   Plan Details:  Continue with POC.  Reinforced home exercise program.

## 2019-02-06 ENCOUNTER — PHYSICAL THERAPY (OUTPATIENT)
Dept: PHYSICAL THERAPY | Facility: REHABILITATION | Age: 79
End: 2019-02-06
Attending: FAMILY MEDICINE
Payer: MEDICARE

## 2019-02-06 DIAGNOSIS — R26.89 BALANCE PROBLEM: ICD-10-CM

## 2019-02-06 DIAGNOSIS — G20.A1 PARKINSON DISEASE: ICD-10-CM

## 2019-02-06 PROCEDURE — 97110 THERAPEUTIC EXERCISES: CPT

## 2019-02-06 NOTE — OP THERAPY DAILY TREATMENT
Outpatient Physical Therapy  DAILY TREATMENT     [address]  [address]  [place] [address]  Phone:  [phone]  Fax:  [phone]    Date: 02/06/2019    Patient: Michael Packer  YOB: 1940  MRN: [id number]     Time Calculation  Start time: 1030  Stop time: 1100 Time Calculation (min): 30 minutes     Chief Complaint: Loss Of Balance    Visit #: 3    SUBJECTIVE:   I've been doing the PWR exercises for HEP and they've gotten a little easier. I haven't noticed a change in balance or getting out of a chair yet.      OBJECTIVE:  Current objective measures: Bradykinetic recovery strategies with stepping recovery exercises  Impaired hip strategy; could not stand on 1/2 foam roller, regressed to narrow DARRYL standing.           Therapeutic Exercises (CPT 51626):     1. Nu step, x 5 min    2. Standing on 1/2 foam roller, Discontinued* Inability to step onto beam    3. Weight shifting, 10x ea, Tandem stance sagittal plane followed by feet shoulder width frontal plane; verbal cues to avoid knee flexion    4. Stepping recovery strategies exercises, 10x ea, Frontal and sagittal plane    5. Seated core strengthening, 10x ea with outstretched hands holding ball, Sagittal plane followed by transverse plane    6. Standing with narrow DARRYL and arms crossed, 20 sec, Regression from 1/2 foam roller      Time-based treatments/modalities:  Therapeutic exercise minutes (CPT 06907): 30 minutes       ASSESSMENT:   Response to treatment: Continue with current HEP. Continue with balance exercises with hip strategy focus followed by exercises for balance recovery. Add posterior chain strengthening exercises next session.    PLAN/RECOMMENDATIONS:   Plan for treatment: therapy treatment to continue next visit.  Planned interventions for next visit: continue with current treatment.     Brady Espinoza, Student

## 2019-02-07 ENCOUNTER — OCCUPATIONAL THERAPY (OUTPATIENT)
Dept: OCCUPATIONAL THERAPY | Facility: REHABILITATION | Age: 79
End: 2019-02-07
Attending: FAMILY MEDICINE
Payer: MEDICARE

## 2019-02-07 DIAGNOSIS — G20.A1 PARKINSON DISEASE: ICD-10-CM

## 2019-02-07 PROCEDURE — 97530 THERAPEUTIC ACTIVITIES: CPT

## 2019-02-07 PROCEDURE — 97166 OT EVAL MOD COMPLEX 45 MIN: CPT

## 2019-02-07 SDOH — ECONOMIC STABILITY: GENERAL: QUALITY OF LIFE: GOOD

## 2019-02-07 ASSESSMENT — BALANCE ASSESSMENTS
BALANCE - STANDING STATIC: FAIR +
BALANCE - STANDING DYNAMIC: FAIR +
BALANCE - SITTING DYNAMIC: GOOD
BALANCE - SITTING STATIC: GOOD

## 2019-02-07 ASSESSMENT — ACTIVITIES OF DAILY LIVING (ADL)
POOR_BALANCE: 1
AMBULATION_WITHOUT_ASSISTIVE_DEVICE: INDEPENDENT

## 2019-02-07 ASSESSMENT — ENCOUNTER SYMPTOMS: PAIN SCALE: 0

## 2019-02-07 NOTE — OP THERAPY EVALUATION
Outpatient Occupational Therapy  INITIAL NEUROLOGICAL EVALUATION    [facility] Occupational Therapy [address]  [address]  [place] [address]  Phone:  [phone]  Fax:  [phone]    Date of Evaluation: 2019    Patient: Michael Packer  YOB: 1940  MRN: [id number]     Referring Provider: Edwar Beal M.D.  [address] [place] [address] [address]  [place] [address]   Referring Diagnosis Parkinson disease (HCC) [G20]     Time Calculation  Start time: 830  Stop time: 930 Time Calculation (min): 60 minutes     Occupational Therapy Occurrence Codes    Date of onset of impairment:  1/24/15   Date of occupational therapy care plan established or reviewed:  19   Date of occupational therapy treatment started:  19          Chief Complaint: Hand Problem (decreased FMC, right  > left) and Loss Of Balance    Visit Diagnoses     ICD-10-CM   1. Parkinson disease (HCC) G20       Subjective:   History of Present Illness:     Date of onset:  2015    Mechanism of injury:  Pt reports a progressive decrease in bilateral hand FMC, right > left  affecting his ability to perform UB dressing and activities which require a higher level of dexterity along with decreased balance.  Quality of life:  Good  Prior level of function:  Independent ADLs, functional mobility, driving, retired   Pain:     Current pain ratin  Social Support:     Patient lives at: mobile home.    Lives with:  Spouse  Hand dominance:  Right  Treatments:     Previous treatment:  Physical therapy and speech therapy  Activities of Daily Living:     Patient reported ADL status: Independent feeding, grooming, bathing via standing shower with 2 grab bars, dressing with min assist for UB dressing and mild difficulty managing buttons, independent driving only during the day.  Pt does not have any concerns about driving but his wife does report concerns about his ability to drive safely.  Patient Goals:     Patient  goals for therapy:  Improved balance, increased motion, independence with ADLs/IADLs and increased strength    Other patient goals:  Improve FMC, get in and out of the car more easily      Past Medical History:   Diagnosis Date   • Chickenpox    • Daytime sleepiness    • GERD (gastroesophageal reflux disease)    • Glaucoma    • Heartburn    • Hyperlipidemia    • Hypertension    • Kidney stone    • Mumps    • Muscle disorder    • Sleep apnea    • Wears glasses      Past Surgical History:   Procedure Laterality Date   • ARTHROSCOPY, KNEE       Social History   Substance Use Topics   • Smoking status: Never Smoker   • Smokeless tobacco: Never Used   • Alcohol use Yes      Comment: very very rarely     Family and Occupational History     Social History   • Marital status:      Spouse name: N/A   • Number of children: N/A   • Years of education: N/A       Objective:   Active Range of Motion:   Upper extremity (left):     All left upper extremity active range of motion: All within functional limits  Upper extremity (right):     All right upper extremity active range of motion: All within functional limits      Strength:     Left upper extremity strength within functional limits.      Right upper extremity strength within functional limits.      , Prehension, Pinch:  /Prehension (left):      strength: Impaired (25lbs)    Opposition: Impaired (mild tremor)  /Prehension (right):      strength: Impaired (35lbs)    Opposition: Impaired (moderate tremor)    Tone, Sensation and Coordination:   Tone:     Left upper extremity muscle tone: Normal    Right upper extremity muscle tone: Resting tremors and Intentional tremors    Sensation   Upper extremity (left):     Light touch: Impaired    Proprioception: Intact   Upper extremity (right):     Light touch: Impaired    Proprioception: Intact     Sensation comments:   [place]: diminished light touch bilateral palmar hands    Coordination   Upper  extremity (left):     Fine motor: Impaired    Gross motor: Impaired    Finger to finger: Impaired (mild tremor)    Finger touch to nose: Impaired (moderate tremor)  Upper extremity (right):     Fine motor: Impaired    Gross motor: Impaired    Finger to finger: Impaired (moderate tremor)    Finger touch to nose: Impaired (moderate tremor)      Coordination comments:   9-Hole Peg Test:  LH: 39 seconds; RH: 45 seconds  Mild resting tremor right > left, fluctuates during activity  Handwriting with fair legibility    Cognition:     Orientation: normal to time, normal to place and normal to person    Direction following: three step    Short term memory: impaired (able to recall 3/3 items after 2 minutes but pt/wife report STM deficits)    Long term memory: intact    Attention span: impaired (decreased divided attention)    Sequencing: intact    Organization: intact    Problem solving: impaired (delayed processing)    Judgement and safety awareness: impaired    Hearing: impaired    Cognition Comments:  Mildly delayed mental processing, decreased self awareness/insight.  Basic calculations to convert minutes to seconds required min cues and an extended period of time to produce correct answer, he required the equation to be written down on paper.    Vision/Perception:     Visual tracking: intact    Convergence: intact    Visual attentions: intact    Visual acuity: intact    Visual scanning: intact    R/L discrimination: intact    R/L neglect: not present    Spatial relations: impaired (see below)    Vision assistive device(s): glasses    Vision/Perception Comments:   MVPT-3 Visual Closure Subtest: 6/13 correct in 165 seconds.  Fail.    Postural Control (Balance)     Sitting (static): Good    Sitting (dynamic): Good    Standing (static): Fair +    Standing (dynamic): Fair +    Ambulation: Level Surfaces   Ambulation without assistive device: independent (bradykinetic)        Therapeutic Treatments and Modalities:    1.  Therapeutic Activities (CPT 36846)    Therapeutic Treatments and Modalities Summary: 9-Hole Peg Test:  LH: 39 seconds; RH: 45 seconds.  MVPT-3 Visual Closure Subtest: 6/13 correct in 165 seconds        Time-based treatments/modalities:  Therapeutic activity minutes (CPT 88761): 15 minutes        Assessment, Response and Plan:   Impairments: abnormal coordination, abnormal muscle firing, fine motor function, impaired balance, impaired physical strength, lacks appropriate home exercise program, limited ADL's and safety issue    Assessment details:  Pt is a 78 y.o male with PD who presents to outpatient OT functioning below baseline secondary to decreased hand strength, motor control, sensation, balance, and cognitive deficits affecting his ability to perform UB dressing and activities which require a higher level of dexterity.  Pt's wife verbalized concerns regarding his ability to drive safely.    Prognosis: good    Goals:   Short Term Goals:   Pt will improve his bilateral hand FMC to manage buttons with minimal extra time  Pt will increase his bilateral  strength >= 5lbs to open jars and bottles  Pt will perform basic calculations for shopping activities with minimal extra time  Pt will increase his standing balance to transport objects from floor to countertops without LOB  Short term goal timespan:  2-4 weeks    Long Term Goals:   Pt will be Mod I UB dressing with adapt techniques  Pt/wife will be independent HEP for strengthening and motor control  Pt will perform the 9-Hole Peg Test with RH in <= 38 seconds  Pt will engage in all components of pre-driving evaluation to determine his ability to safely operate a motor vehicle  Long term goal timespan:  4-6 weeks    Plan:   Therapy options:  Occupational therapy treatment to continue  Planned therapy interventions:  Cognitive Skill Development (CPT 85589), Neuromuscular Re-education (CPT 47121), Therapeutic Activities (CPT 43658), Therapeutic Exercise (CPT  33739), Functional Training, Self Care (CPT 82050) and Self Care ADL Training (CPT 07800)  Other planned therapy interventions:  Pre-driving assessment  Frequency:  2x week  Duration in weeks:  5  Duration in visits:  10  Discussed with:  Patient and family      Functional Limitations and Severity Modifiers  OT Functional Assessment Tool Used: 9-Hole Peg Test  OT Functional Assessment Score: LH: 39 seconds; RH: 45 seconds   Current:     Goal:         Referring provider co-signature:  I have reviewed this plan of care and my co-signature certifies the need for services.  Certification Dates:   From 2/7/19    To 4/11/19    Physician Signature: ________________________________ Date: ______________

## 2019-02-13 ENCOUNTER — OCCUPATIONAL THERAPY (OUTPATIENT)
Dept: OCCUPATIONAL THERAPY | Facility: REHABILITATION | Age: 79
End: 2019-02-13
Attending: FAMILY MEDICINE
Payer: MEDICARE

## 2019-02-13 ENCOUNTER — PHYSICAL THERAPY (OUTPATIENT)
Dept: PHYSICAL THERAPY | Facility: REHABILITATION | Age: 79
End: 2019-02-13
Attending: FAMILY MEDICINE
Payer: MEDICARE

## 2019-02-13 ENCOUNTER — SPEECH THERAPY (OUTPATIENT)
Dept: SPEECH THERAPY | Facility: REHABILITATION | Age: 79
End: 2019-02-13
Attending: INTERNAL MEDICINE
Payer: MEDICARE

## 2019-02-13 DIAGNOSIS — R26.89 BALANCE PROBLEM: ICD-10-CM

## 2019-02-13 DIAGNOSIS — G20.A1 PARKINSON DISEASE: ICD-10-CM

## 2019-02-13 DIAGNOSIS — R47.1 DYSARTHRIA: ICD-10-CM

## 2019-02-13 PROCEDURE — 97110 THERAPEUTIC EXERCISES: CPT | Mod: XE

## 2019-02-13 PROCEDURE — 92507 TX SP LANG VOICE COMM INDIV: CPT

## 2019-02-13 NOTE — OP THERAPY DAILY TREATMENT
"  Outpatient Physical Therapy  DAILY TREATMENT     [facility] Physical [address]  [address]  [place] [address]  Phone:  [phone]  Fax:  [phone]    Date: 02/13/2019    Patient: Michael Packer  YOB: 1940  MRN: [id number]     Time Calculation  Start time: 0830  Stop time: 0900 Time Calculation (min): 30 minutes     Chief Complaint: Loss Of Balance    Visit #: 4    SUBJECTIVE: I feel like things are mostly the same. Per pt wife, \"I notice he's more able to move and he can walk around and go shopping with me now whereas he used to go sit when I did that; I'm seeing an improvement. He's still having a lot of problems twisting his body, I don't think he's doing his HEP right with the twist.\"    OBJECTIVE:  Current objective measures:           Therapeutic Exercises (CPT 26194):     1. Nu step, x 5 min    3. Weight shifting, 10x ea, Tandem stance sagittal plane followed by feet shoulder width frontal plane; verbal cues to avoid knee flexion    4. Stepping recovery strategies exercises, 10x ea, Frontal and sagittal plane; added to HEP    5. Reviewed HEP      Time-based treatments/modalities:  Therapeutic exercise minutes (CPT 49372): 30 minutes      ASSESSMENT:   Response to treatment: Pt is demonstrating improvements in walking tolerance and ability to initiate movement. Continues to have difficulty with step recovery and trunk rigidity. Worked on step recovery strategies; added to HEP. Reviewed current HEP with pt and wife. Continue with step recovery and add trunk twisting to address rigidity.    PLAN/RECOMMENDATIONS:   Plan for treatment: therapy treatment to continue next visit.  Planned interventions for next visit: continue with current treatment. Add PWR trunk twisting in standing. Drumming exercise with swiss balls to metronome.  Attempt step recovery with metronome.    Brady Espinoza, Student      "

## 2019-02-13 NOTE — OP THERAPY DAILY TREATMENT
"  Outpatient Speech Therapy  DAILY TREATMENT     [facility] Speech [address].  [address]  [place] [address]  Phone:  [phone]  Fax:  [phone]    Date: 02/13/2019    Patient: Michael Packer  YOB: 1940  MRN: [id number]     Time Calculation  Start time: 1000  Stop time: 1030 Time Calculation (min): 30 minutes     Chief Complaint: Poor Speech (Voice (Parkinson's disease))    Visit #: 8    Subjective:   Reason for Therapy:     Reason For Evaluation:  Voice and Dysarthria  Social Support:     Accompanied By:  Spouse (wife present and supportive)    Patient Mental Status:  Alert and Responsive  Progress Factors:     Progression:  Getting Better  Additional Subjective Comments:      Patient reports no difficulties or change to swallow function.  Patient with improved independence in voicing to talking level at start and throughout therapy session. Patient states that he uses his wife as a visual reminder to talk loud/ use louder voice.       Objective:   Treatments/Interventions Performed:  Patient/Caregiver education, Compensatory strategy training, Speech/Language treatment and Voice training         Speech Therapy Assessment:     Speech Mechanism Assessment:     Patient voice description: Clear (intensity adequate for novel phrase/ sentence level productions)    Patient uses adequate breath support: No (Diaphragmatic breathing)    Patient breath rate: Slow  Speech mechanism comments: Diaphragmatic breathing addressed improved breath support for production of voiceless consonants /s, f/ \"sh\" and \"th\".  Patient able to maintain consistent breath to manage voiceless sounds to 10 seconds for /s/, 11 seconds for \"th\", 9 seconds for /f/ and 8 seconds for \"sh\".  Patient able to carryover breath support strategies learned to structured sentence level productions through reading stimuli: maintaining voice to talking level 6 word sentences 100% accuracy, progressed to 8 word " sentences 100% accuracy.        Speech Therapy Plan :   Prognosis & Recommendations  Impression Summary:  Patient with noted improvement in carryover in use of breath and speech patterns educated during diaphragmatic breathing exercises into independent speech productions as evidenced by consistent breath in prior to speech production resulting in appropriate loudness levels throughout therapy session.   Therapy Recommendations  Recommendation: Individual Speech Therapy,  Planned Therapy Interventions:  Home Program, Patient/Caregiver Education, Compensatory Strategy Training, Respiratory coordination/support training and Voice training,   Plan Details:  Continue with POC.

## 2019-02-13 NOTE — OP THERAPY DAILY TREATMENT
"  Outpatient Occupational Therapy  DAILY TREATMENT     [facility] Occupational Therapy [address]  [address]  [place] [address]  Phone:  [phone]  Fax:  [phone]    Date: 02/13/2019    Patient: Michael Packer  YOB: 1940  MRN: [id number]     Time Calculation  Start time: 0800  Stop time: 0830 Time Calculation (min): 30 minutes     Chief Complaint: Hand Problem (decreased FMC R > L)    Visit #: 2    SUBJECTIVE: \"I feel pretty good\"    OBJECTIVE:  Current objective measures:    strength: Left: 50lbs; RH: 55lbs        Therapeutic Exercises (CPT 14079):     1. Bilateral hands    Therapeutic Exercise Summary:  Issued and instructed on moderate resistance theraputty exercises focused on , pinch, rolling a log, in-hand manipulation, picking out coins, pulling apart and folding in intrinsic plus position, and GMC ther ex punching holes with black plastic tube.       Time-based treatments/modalities:  Therapeutic exercise minutes (CPT 34107): 30 minutes        ASSESSMENT:   Response to treatment: Pt making good progress today with bilateral hand strength and motor control for ADLs and IADLs.  Issued written HEP with good understanding.    PLAN/RECOMMENDATIONS:   Plan for treatment: therapy treatment to continue next visit.  Planned interventions for next visit: continue with current treatment, cognitive skill development (CPT 08102), neuromuscular re-education (CPT 68533), therapeutic activities (CPT 09687) and therapeutic exercise (CPT 93767)      "

## 2019-02-18 ENCOUNTER — PHYSICAL THERAPY (OUTPATIENT)
Dept: PHYSICAL THERAPY | Facility: REHABILITATION | Age: 79
End: 2019-02-18
Attending: FAMILY MEDICINE
Payer: MEDICARE

## 2019-02-18 ENCOUNTER — SPEECH THERAPY (OUTPATIENT)
Dept: SPEECH THERAPY | Facility: REHABILITATION | Age: 79
End: 2019-02-18
Attending: INTERNAL MEDICINE
Payer: MEDICARE

## 2019-02-18 DIAGNOSIS — R47.1 DYSARTHRIA: ICD-10-CM

## 2019-02-18 DIAGNOSIS — R26.89 BALANCE PROBLEM: ICD-10-CM

## 2019-02-18 DIAGNOSIS — R13.12 OROPHARYNGEAL DYSPHAGIA: ICD-10-CM

## 2019-02-18 DIAGNOSIS — G20.A1 PARKINSON DISEASE: ICD-10-CM

## 2019-02-18 PROCEDURE — 92507 TX SP LANG VOICE COMM INDIV: CPT

## 2019-02-18 PROCEDURE — 92526 ORAL FUNCTION THERAPY: CPT

## 2019-02-18 PROCEDURE — 97110 THERAPEUTIC EXERCISES: CPT

## 2019-02-18 NOTE — OP THERAPY DAILY TREATMENT
Outpatient Speech Therapy  DAILY TREATMENT     [address]  [address]  [place] [address]  Phone:  [phone]  Fax:  [phone]    Date: 02/18/2019    Patient: Michael Packer  YOB: 1940  MRN: [id number]     Time Calculation  Start time: 1000  Stop time: 1035 Time Calculation (min): 35 minutes     Chief Complaint: Poor Speech and Difficulty Swallowing    Visit #: 9    Subjective:   Reason for Therapy:     Reason For Evaluation:  Dysarthria, Voice and Dysphagia  Social Support:     Accompanied By:  Spouse (wife, present and supportive)    Patient Mental Status:  Alert and Responsive      Objective:   Treatments/Interventions Performed:  Patient/Caregiver education, Compensatory strategy training, Speech/Language treatment, Voice training and Dysphagia treatment         Speech Therapy Assessment:     Speech Mechanism Assessment:   Speech mechanism comments: Respiration for voicing:  Sustained phonation /ah/ maintained to 10-13 seconds x 3 trials; pitch elevation /ee/ maintained to 15 seconds x 3 trials.  Voiceless consonant /s/ maintained for 10-13 seconds. Voiced consonant /z/ maintained to 20 seconds. Loudness addressed through structured sentence production to 10 or more words:  Completed to appropriate talking loudness level with 100% intelligibility.     Pharyngeal Phase Assessment:     Pharyngeal phase comments: Patient participated in therapist directed trial:  Regular solid (2 dry crackers) and 1/2 cup thin liquid water via cup swallows. Patient with small, single bites independent. Patient with independence in use of tongue sweeps/ liquid wash to clear min oral residue.  Patient with overall marked improvements in oral bolus management resulting in improved safety and accuracy in swallow.  Patient demonstrated no overt signs or symptoms of aspiration with clear vocal quality post all swallows.       Speech Therapy Plan :   Therapy  Recommendations  Recommendation: Individual Speech Therapy, DC goals addressing dysphagia.  Patient has met all swallow goals at this time.  Continue with goals addressing voice.  Consider D/C planning.   Planned Therapy Interventions:  Compensatory Strategy Training, Home Program, Respiratory coordination/support training, Voice training, Dysphagia treatment, Speech/Language training and Patient/Caregiver Education,   Plan Details:  Continue with POC. Continue with home program.

## 2019-02-18 NOTE — OP THERAPY DAILY TREATMENT
"  Outpatient Physical Therapy  DAILY TREATMENT     [facility] Physical [address].  [address]  [place] [address]  Phone:  [phone]  Fax:  [phone]    Date: 02/18/2019    Patient: Michael Packer  YOB: 1940  MRN: [id number]     Time Calculation  Start time: 1030  Stop time: 1100 Time Calculation (min): 30 minutes     Chief Complaint: Loss Of Balance    Visit #: 5    SUBJECTIVE: I feel like getting out of chairs is easier; twisting is still the hardest part. I don't know if I'm doing the exercises at home right. Per pt wife, \"I have seen such an improvement, I'm so grateful for what you guys have done with him and I see he has so much more confidence. You have given us hope.      OBJECTIVE:            Therapeutic Exercises (CPT 87515):     1. Reviewed HEP    2. Standing trunk twist at counter, Added to HEP    3. Standing trunk twist with ball, 10x each direction, Gait belt + SBA x 2, Able to twist trunk with verbal cues for eye movements    4. Stepping recovery strategies exercises, 10x ea with 4 cones; +dual tasking with colors, Frontal and sagittal plane; gait belt +SBA    9. UPOC: 3/21/18      Time-based treatments/modalities:  Therapeutic exercise minutes (CPT 77759): 30 minutes       ASSESSMENT:   Response to treatment: Patient is demonstrating improvements in balance, step recovery, and transitions sit to stand. Added trunk rotation specific exercises today. Still has difficulty with trunk rotation but improves with visual cuing (following moving object or hand). Added trunk twist to HEP. Pt has difficulty remembering current HEP; presented pt and wife with [facility] link to PWR moves online for reminders. Assess response to perturbations next visit.     PLAN/RECOMMENDATIONS:   Plan for treatment: therapy treatment to continue next visit.  Planned interventions for next visit: continue with current treatment. Reassess perturbations and step recovery. Continue " with trunk twisting exercises.    Brady Espinoza, Student

## 2019-02-20 ENCOUNTER — OCCUPATIONAL THERAPY (OUTPATIENT)
Dept: OCCUPATIONAL THERAPY | Facility: REHABILITATION | Age: 79
End: 2019-02-20
Attending: FAMILY MEDICINE
Payer: MEDICARE

## 2019-02-20 DIAGNOSIS — G20.A1 PARKINSON DISEASE: ICD-10-CM

## 2019-02-20 PROCEDURE — 97530 THERAPEUTIC ACTIVITIES: CPT

## 2019-02-20 PROCEDURE — 97110 THERAPEUTIC EXERCISES: CPT

## 2019-02-20 NOTE — OP THERAPY DAILY TREATMENT
"  Outpatient Occupational Therapy  DAILY TREATMENT     [facility] Occupational Therapy [address]  [address]  [place] [address]  Phone:  [phone]  Fax:  [phone]    Date: 02/20/2019    Patient: Michael Packer  YOB: 1940  MRN: [id number]     Time Calculation  Start time: 0800  Stop time: 0830 Time Calculation (min): 30 minutes     Chief Complaint: Hand Problem (decreased FMC, strength)    Visit #: 3    SUBJECTIVE: \"I've been working on the exercises\"    OBJECTIVE:  Current objective measures:    strength: Left 48lbs; Right 58lbs        Therapeutic Exercises (CPT 43639):     1. Bilateral hands    Therapeutic Exercise Summary:  Sequential digit flexion/extension \"drumming\", \"counting\",  \"ok/shoot\",  tongue depressor manipulation exercise rotating bilateral directions x 20.      Therapeutic Treatments and Modalities:    1. Therapeutic Activities (CPT 92339)    Therapeutic Treatments and Modalities Summary: Purdue Pegboard: 10 rows each hand completed in 9 minutes 10 seconds, (RH more challenging)      Time-based treatments/modalities:  Therapeutic exercise minutes (CPT 37654): 15 minutes  Therapeutic activity minutes (CPT 70148): 15 minutes      ASSESSMENT:   Response to treatment:  Pt making progress with bilateral hand motor control for self-care duties in response to graded exercises.  Written HEP updated with good understanding.    PLAN/RECOMMENDATIONS:   Plan for treatment: therapy treatment to continue next visit.  Planned interventions for next visit: continue with current treatment, functional training/self care (CPT 82941), neuromuscular re-education (CPT 17045), therapeutic activities (CPT 63215) and therapeutic exercise (CPT 59695)      "

## 2019-02-21 RX ORDER — ESOMEPRAZOLE MAGNESIUM 40 MG/1
40 CAPSULE, DELAYED RELEASE ORAL DAILY
Qty: 30 CAP | Refills: 3 | Status: SHIPPED | OUTPATIENT
Start: 2019-02-21 | End: 2019-05-14 | Stop reason: SDUPTHER

## 2019-02-22 ENCOUNTER — SPEECH THERAPY (OUTPATIENT)
Dept: SPEECH THERAPY | Facility: REHABILITATION | Age: 79
End: 2019-02-22
Attending: INTERNAL MEDICINE
Payer: MEDICARE

## 2019-02-22 DIAGNOSIS — G20.A1 PARKINSON DISEASE: ICD-10-CM

## 2019-02-22 DIAGNOSIS — R47.1 DYSARTHRIA: ICD-10-CM

## 2019-02-22 PROCEDURE — 92507 TX SP LANG VOICE COMM INDIV: CPT

## 2019-02-22 NOTE — OP THERAPY DAILY TREATMENT
Outpatient Speech Therapy  DAILY TREATMENT     [facility] Speech [address]  [address]  [place] [address]  Phone:  [phone]  Fax:  [phone]    Date: 02/22/2019    Patient: Michael Packer  YOB: 1940  MRN: [id number]     Time Calculation  Start time: 1100  Stop time: 1135 Time Calculation (min): 35 minutes     Chief Complaint: Poor Speech    Visit #: 10    Subjective Evaluation    Objective:   Treatments/Interventions Performed:  Patient/Caregiver education, Compensatory strategy training, Respiratory Coordination / Support training, Speech/Language treatment, Voice training and Home program         Speech Therapy Assessment:     Speech Mechanism Assessment:     Patient voice description: Clear  Speech mechanism comments: Respiration for voicing:  Voiceless consonants targeted maintained for 8-10 seconds. Voiced consonant /z/ maintained to 15 seconds. Loudness addressed through structured sentence production to 10 or more words:  Completed to appropriate talking loudness level with 100% intelligibility.  Unstructured word production targeted to simple question answer:  Initially patient demonstrated increased difficulty in coordination of breath-speech requiring direct cues to ensure deep breath in prior to speech production; however, with continued guided practice, patient progressed to production of single words with 10/12 = 83% accuracy.  Progression to simple phrase level productions:  Patient required max cues to increase vocal loudness beyond level 4 (using 1-10 voice scale).  Patient/ wife provided with strategies on how to encourage carryover in use of vocal loudness in home setting.       Speech Therapy Plan :   Therapy Recommendations  Recommendation: Individual Speech Therapy,  Planned Therapy Interventions:  Home Program, Patient/Caregiver Education, Compensatory Strategy Training, Respiratory coordination/support training, Voice training and  Speech/Language training,

## 2019-02-25 ENCOUNTER — SPEECH THERAPY (OUTPATIENT)
Dept: SPEECH THERAPY | Facility: REHABILITATION | Age: 79
End: 2019-02-25
Attending: INTERNAL MEDICINE
Payer: MEDICARE

## 2019-02-25 ENCOUNTER — PHYSICAL THERAPY (OUTPATIENT)
Dept: PHYSICAL THERAPY | Facility: REHABILITATION | Age: 79
End: 2019-02-25
Attending: FAMILY MEDICINE
Payer: MEDICARE

## 2019-02-25 DIAGNOSIS — R26.89 BALANCE PROBLEM: ICD-10-CM

## 2019-02-25 DIAGNOSIS — G20.A1 PARKINSON DISEASE: ICD-10-CM

## 2019-02-25 DIAGNOSIS — R47.1 DYSARTHRIA: ICD-10-CM

## 2019-02-25 PROCEDURE — 92507 TX SP LANG VOICE COMM INDIV: CPT

## 2019-02-25 PROCEDURE — 97110 THERAPEUTIC EXERCISES: CPT

## 2019-02-25 NOTE — OP THERAPY DISCHARGE SUMMARY
Outpatient Speech Therapy  DISCHARGE SUMMARY NOTE      [facility]  [address]  [address]  [place] [address]  Phone:  [phone]  Fax:  [phone]    Date of Visit: 02/25/2019    Patient: Michael Packer  YOB: 1940  MRN: [id number]     Referring Provider: Henrry Carranza M.D.  [address]  [address]  [place] [address]   Referring Diagnosis: Parkinson's disease [G20];Interstitial pulmonary disease, unspecified [J84.9]     Visit #: 11    Time Calculation             Speech Therapy Occurrence Codes    Date of Onset of Impairment:  12/28/18   Date speech therapy care plan established or reviewed:  1/11/19   Date speech therapy treatment started:  1/11/19          Chief Complaint: Poor Speech    Visit Diagnoses     ICD-10-CM   1. Dysarthria R47.1       Subjective:   Reason for Therapy:     Reason For Evaluation:  Dysphagia, Dysarthria and Voice    Onset Description:  Patient 78 year old male with history of Parkinson's disease s/p 4 years from diagnosis re-establishing care with providers following move from [facility]. Patient referred by Pulmonology to rule out aspiration secondary to long standing history of cough with history of interstitial lung disease of uncertain etiology.    Social Support:     Accompanied By:  Spouse (Wife present and supportive to speech therapy sessions)    Patient Mental Status:  Alert and Responsive  Progress Factors:     Progression:  Getting Better  Therapy History:     Previous Treatments and Dates:  Patient participated in direct, skilled outpatient speech therapy services addressing voice, speech production skill and swallow since initial evaluation 1/11/2019.     Current Diet:  Regular Diet and Thin Liquids    Hearing:  Hard of hearing (It was recommended patient participate in formal evaluation of hearing through Audiological evaluation)      Objective:   Treatments/Interventions Performed:  Voice training,  Patient/Caregiver education, Compensatory strategy training, Speech/Language treatment and Dysphagia treatment  Other Treatment Interventions:  French Dysarthria Assessment 2 (FDA 2)  Treatment Intervention tool(s) used:  Results of FDA 2 revealed patient achieved the following results:  (a) Reflexes: a NORMAL function; (b) Respiration: a-b NORMAL-MINIMAL; (c) Lips: a NORMAL function; (d) Palate: a NORMAL function; (e) Laryngeal: a NORMAL function; (f) Tongue: a-b MINIMAL; and (g) Intelligibility 100% accuracy as deemed by examiner.     Objective Details:  Results of re-administration of the FDA 2 revealed that patient has demonstrated marked progress and improvement in all areas targeted through direct, skilled intervention.  Patient demonstrated the following improvements specific to areas assessed:  (b) Respiration: improved from c MODERATE to a NORMAL; (c) Lips: improved from b MILD to NORMAL; (e) Laryngeal: b-c MODERATE a NORMAL; (f) Tongue: improved from b-d MODERATE TO SEVERE to a-b MINIMAL; and (g) Intelligibility improved from % accuracy for words/ phrases as deemed by examiner to 100% accuracy for words/ phrases.   Overall results suggest that patient presents with oral motor coordination and function within normal limits.         Speech Therapy Assessment:     Speech Mechanism Assessment:     Portions of the Other (Frenchay Dysarthria Assessment 2 (FDA 2)) are used.    Patient voice description: Clear    Patient's oral movements are voluntary and coordination: Supervision Required    Patient speaks fluently: WFL    Patient exhibits articulatory precision: Supervision Required    Patient exhibits single word intelligibility: WFL    Patient exhibits sentence level intelligibility: WFL    Patient exhibits conversational intelligibility: Supervision Required (need for occasional cues for increaesd loudness)    Patient dysarthria: Supervision Required    Patient uses adequate breath support: Yes (given  verbal cues for diaphragmatic breathing)    Patient breath rate: Normal    Oral Motor Status:      Portions of the Other (Frenchay Dysarthria Assessment 2 (FDA 2)) are used.    Labial strength and control for patient: Good    Lingual strength and control for patient: Good    Patient saliva management: GoodPatient oral sensation and awareness: Good    Oral Phase Assessment:     Portions of the Other (Bedside swallow assessment completed 2/18/2019) are used.    Types of food within functional limits: Puree, Mechanical Soft, Regular and Thin Liquid    Pharyngeal Phase Assessment:     Portions of the Other (Bedside swallow assessment completed 2/18/2019) are used.    Delayed Swallow    Food types within functional limits: Puree, Mechanical Soft, Regular and Thin Liquid    Pharyngeal phase comments: Patient with need for continued use of compensatory swallow strategies upon discharge from outpatient speech therapy services with recommendations for strategies including:  Posture during intake, small/ single bites of foods/ liquids, effortful swallow (bite + swallow) and use of hard throat clear + reswallow.  Monitoring for signs and symptoms of aspiration/ completion of oral hygiene daily also recommended to continue.       Speech Therapy Plan :   Prognosis & Recommendations  Impression Summary:  Patient demonstrated marked progress with outpatient speech therapy as evidenced by results of re-administration of Frenchay Dysarthria Assessment with patient demonstrating improvements in all areas to NORMAL function.  Patient goals addressing swallow were recommended for discharge 2/18/2019. Patient has met all STGs and LTGs for outpatient speech therapy services with discharge recommended at this time.   Compensatory swallow strategies:  Upright position 90 degrees during meal and 45 minutes after meal, Reduce bolus size, Alternate liquids/solids, No talking while eating and Cough/clear wet vocal quality after swallow  Diet  Recommendation:  Normal Consistency  Liquid Recommendation:  Thin Liquid  Patient progression on Short Term Goals:  1. Patient will complete OMEX targeting:  Lips, tongue, laryngeal coordination/ strength for speech production and swallow 95% accuracy independent as evidenced by FDA 2 score of a-b in all areas:  GOAL MET.  2. Patient will increase intelligibility through stating and using Think Loud- Think Shout speech production strategy to increase accuracy in word productions progressing to simple phrases 95% accuracy independent: GOAL MET.  3. Patient will state and use compensatory swallow strategies to improve safety in swallow of mechanical soft solids, progressing to regular solids during therapist directed trials demonstrating no overt signs or symptoms of aspiration 90% accuracy:  GOAL MET.  Patient demonstrated safety in swallow of regular solids, thin liquids during therapist directed trials.     Patient progression on Long Term Goals:  1. Patient will use compensatory speech strategies to increase accuracy in speech production for intelligibility rating of 100% accuracy with familiar and unfamiliar listeners to independent level:  GOAL MET.  2.  Patient will demonstrate safety in consumption of regular solids, thin liquids using compensatory swallow strategies modified independent demonstrating no overt signs or symptoms of aspiration for primary nutrition needs: GOAL MET.     Therapy Recommendations  Recommendation:  No further speech therapy indicated at this time,  Planned Therapy Interventions:  Home Program,   Plan Details:  Patient encouraged to continue with home program to maintain current level of function achieved through participation in outpatient speech therapy services given degenerative nature of patient primary diagnosis of Parkinson's disease.

## 2019-02-25 NOTE — OP THERAPY DAILY TREATMENT
Outpatient Speech Therapy  DAILY TREATMENT     [facility] Speech [address]  [address]  [place] [address]  Phone:  [phone]  Fax:  [phone]    Date: 02/25/2019    Patient: Michael Packer  YOB: 1940  MRN: [id number]     Time Calculation  Start time: 0900  Stop time: 0930 Time Calculation (min): 30 minutes     Chief Complaint: Poor Speech    Visit #: 11    Subjective:   Reason for Therapy:     Reason For Evaluation:  Dysarthria and Voice  Social Support:     Accompanied By:  Spouse (wife present and supportive)    Patient Mental Status:  Alert and Responsive  Progress Factors:     Progression:  Getting Better  Additional Subjective Comments:      Patient wife reports that patient demonstrated appropriate loudness throughout weekend requiring single reminder for increased loudness to increase wife's understanding over three days since previous speech therapy visit.  Patient presented with independence in appropriate loudness for novel, independent speech production tasks this day.       Objective:   Treatments/Interventions Performed:  Compensatory strategy training, Patient/Caregiver education, Voice training, Home program and Speech/Language treatment  Other Treatment Interventions:  Frenchay Dysarthria Assessment 2 (FDA 2) re-administered.   Treatment Intervention tool(s) used:  Results of FDA 2 revealed patient achieved the following results:  (a) Reflexes: a NORMAL function; (b) Respiration: a-b NORMAL-MINIMAL; (c) Lips: a NORMAL function; (d) Palate: a NORMAL function; (e) Laryngeal: a NORMAL function; (f) Tongue: a-b MINIMAL; and (g) Intelligibility 100% accuracy as deemed by examiner.     Objective Details:  Results of re-administration of the FDA 2 revealed that patient has demonstrated marked progress and improvement in all areas targeted through direct, skilled intervention.  Patient demonstrated the following improvements specific to areas assessed:   (b) Respiration: improved from c MODERATE to a NORMAL; (c) Lips: improved from b MILD to NORMAL; (e) Laryngeal: b-c MODERATE a NORMAL; (f) Tongue: improved from b-d MODERATE TO SEVERE to a-b MINIMAL; and (g) Intelligibility improved from % accuracy for words/ phrases as deemed by examiner to 100% accuracy for words/ phrases.   Overall results suggest that patient presents with oral motor coordination and function within normal limits.          Speech Therapy Assessment:     Speech Mechanism Assessment:     ST Speech Mechanism/Voice Assessment Used: Frenchay Dysarthria Assessment 2 (FDA 2)    Patient voice description: Clear    Patient's oral movements are voluntary and coordination: Supervision Required    Patient exhibits articulatory precision: Supervision Required    Patient exhibits single word intelligibility: WFL    Patient exhibits sentence level intelligibility: WFL    Patient exhibits conversational intelligibility: Supervision Required (need for occasional cues for increaesd loudness)    Patient dysarthria: Supervision Required    Patient uses adequate breath support: Yes (given verbal cues for diaphragmatic breathing)    Patient breath rate: Normal    Oral Motor Status:      Portions of the Other (Frenchay Assessment of Dysarthria 2 (FDA 2)) are used.    Labial strength and control for patient: Good    Lingual strength and control for patient: Good    Patient saliva management: GoodPatient oral sensation and awareness: Good      Speech Therapy Plan :   Prognosis & Recommendations  Impression Summary: Patient demonstrated marked progress with outpatient speech therapy as evidenced by results of re-administration of Frenchay Dysarthria Assessment with patient demonstrating improvements in all areas to NORMAL function.  Patient has met all STGs and LTGs for outpatient speech therapy services with discharge recommended at this time.   Therapy Recommendations  Recommendation:  No further speech therapy  indicated at this time, It is recommended patient continue with home program addressing voice, speech and swallow as outlined through participation in outpatient speech therapy sessions.   Planned Therapy Interventions:  Home Program,

## 2019-02-25 NOTE — OP THERAPY DAILY TREATMENT
Outpatient Physical Therapy  DAILY TREATMENT     [facility] Physical [address]  [address]  [place] [address]  Phone:  [phone]  Fax:  [phone]    Date: 02/25/2019    Patient: Michael Packer  YOB: 1940  MRN: [id number]     Time Calculation  Start time: 0930  Stop time: 1000 Time Calculation (min): 30 minutes     Chief Complaint: Difficulty Walking and Loss Of Balance    Visit #: 6    SUBJECTIVE: I feel fine today, I just graduated from speech. There are a few home exercises that I'm not sure I'm doing correctly. I'm also having a hard time getting in and out of the drivers side of a car. I also have a hard time getting out of my rocking chair.    OBJECTIVE:          Therapeutic Exercises (CPT 23131):     1. Nu step x 5 min    2. Reviewed HEP    3. Standing trunk twist with ball, 10x each direction, Gait belt + SBA x 2, Able to twist trunk with verbal cues for eye movements    4. Stepping over 2 objects with sit to stand, 4x x 6 ft    5. Simulated car transfers, 10x, Added to HEP; instructions to get in car backwards using handrail and using large steps to enter    9. UPOC: 3/21/18      Time-based treatments/modalities:  Therapeutic exercise minutes (CPT 56010): 30 minutes      ASSESSMENT:   Response to treatment: Pt demonstrating increased endurance during exercises; able to complete transfers with more ease. Focused on car transfers today; added training sequence to HEP. Patient and wife continue to have difficulty remembering HEP despite written and video instructions; continue to review HEP in further sessions. Add SLS exercises. Revisit recovery stepping.    PLAN/RECOMMENDATIONS:   Plan for treatment: therapy treatment to continue next visit.  Planned interventions for next visit: continue with current treatment. Assess response to car transfer training. Step over objects obstacle course. Perturbations and recovery stepping.    Brady Espinoza  Student

## 2019-02-27 ENCOUNTER — OCCUPATIONAL THERAPY (OUTPATIENT)
Dept: OCCUPATIONAL THERAPY | Facility: REHABILITATION | Age: 79
End: 2019-02-27
Attending: FAMILY MEDICINE
Payer: MEDICARE

## 2019-02-27 DIAGNOSIS — G20.A1 PARKINSON DISEASE: ICD-10-CM

## 2019-02-27 PROCEDURE — 97110 THERAPEUTIC EXERCISES: CPT

## 2019-02-27 NOTE — OP THERAPY DAILY TREATMENT
"  Outpatient Occupational Therapy  DAILY TREATMENT     [facility] Occupational Therapy [address]  [address]  [place] [address]  Phone:  [phone]  Fax:  [phone]    Date: 02/27/2019    Patient: Michael Packer  YOB: 1940  MRN: [id number]     Time Calculation  Start time: 0800  Stop time: 0830 Time Calculation (min): 30 minutes     Chief Complaint: Hand Weakness (bilateral weakness, dec [facility])    Visit #: 4    SUBJECTIVE: \"I've been doing my exercises\"    OBJECTIVE:  Current objective measures:   Grooved Pegboard:  RH: 90 seconds for 2 rows; LH: 71 seconds for 2 rows        Therapeutic Exercises (CPT 23983):     1. Bilateral hands    Therapeutic Exercise Summary:  Alternating drawing crosses with BH with hand over hand initially and tracing over pattern on paper to understand concept and sequence.  Drawing crosses with with RH and alternate by tapping dots with left hand, then reverse multiple reps.  \"Ok/shoot\",  Crumple up paper towel into a tight ball, shake and flatten out on table multiple reps.      Time-based treatments/modalities:  Therapeutic exercise minutes (CPT 22637): 30 minutes      ASSESSMENT:   Response to treatment:  Pt making gains with bilateral hand strength and motor control for ADLs and IADLs in response to graded challenges.  Mod cues overall for motor planning and execution of alternating movements.  HEP updated with good understanding.    PLAN/RECOMMENDATIONS:   Plan for treatment: therapy treatment to continue next visit.  Planned interventions for next visit: continue with current treatment, cognitive skill development (CPT 52525), functional training/self care (CPT 63101), therapeutic activities (CPT 55100) and therapeutic exercise (CPT 08094)      "

## 2019-02-28 ENCOUNTER — APPOINTMENT (OUTPATIENT)
Dept: SPEECH THERAPY | Facility: REHABILITATION | Age: 79
End: 2019-02-28
Attending: INTERNAL MEDICINE
Payer: MEDICARE

## 2019-03-04 ENCOUNTER — OCCUPATIONAL THERAPY (OUTPATIENT)
Dept: OCCUPATIONAL THERAPY | Facility: REHABILITATION | Age: 79
End: 2019-03-04
Attending: FAMILY MEDICINE
Payer: MEDICARE

## 2019-03-04 DIAGNOSIS — G20.A1 PARKINSON DISEASE: ICD-10-CM

## 2019-03-04 PROCEDURE — 97530 THERAPEUTIC ACTIVITIES: CPT
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
## 2019-03-05 ENCOUNTER — OFFICE VISIT (OUTPATIENT)
Dept: MEDICAL GROUP | Facility: MEDICAL CENTER | Age: 79
End: 2019-03-05
Payer: MEDICARE

## 2019-03-05 VITALS
OXYGEN SATURATION: 95 % | WEIGHT: 164.68 LBS | HEIGHT: 66 IN | BODY MASS INDEX: 26.47 KG/M2 | TEMPERATURE: 98.5 F | SYSTOLIC BLOOD PRESSURE: 117 MMHG | DIASTOLIC BLOOD PRESSURE: 74 MMHG | HEART RATE: 99 BPM | RESPIRATION RATE: 16 BRPM

## 2019-03-05 DIAGNOSIS — G20.A1 PARKINSON DISEASE: ICD-10-CM

## 2019-03-05 DIAGNOSIS — I10 ESSENTIAL HYPERTENSION: ICD-10-CM

## 2019-03-05 DIAGNOSIS — E78.2 MIXED HYPERLIPIDEMIA: ICD-10-CM

## 2019-03-05 DIAGNOSIS — K21.9 GASTROESOPHAGEAL REFLUX DISEASE WITHOUT ESOPHAGITIS: ICD-10-CM

## 2019-03-05 DIAGNOSIS — Z23 NEED FOR VACCINATION: ICD-10-CM

## 2019-03-05 PROCEDURE — G0009 ADMIN PNEUMOCOCCAL VACCINE: HCPCS | Performed by: FAMILY MEDICINE

## 2019-03-05 PROCEDURE — 90670 PCV13 VACCINE IM: CPT | Performed by: FAMILY MEDICINE

## 2019-03-05 PROCEDURE — 99214 OFFICE O/P EST MOD 30 MIN: CPT | Mod: 25 | Performed by: FAMILY MEDICINE

## 2019-03-05 ASSESSMENT — PATIENT HEALTH QUESTIONNAIRE - PHQ9: CLINICAL INTERPRETATION OF PHQ2 SCORE: 0

## 2019-03-05 NOTE — PROGRESS NOTES
CC: Parkinson disease, hypertension, hyperlipidemia, acid reflux    HPI:   Michael presents today to discuss the following medical issues:     Parkinson disease (HCC)  Patient stated his muscles rigidity and tremulous has improved with occupational therapy.  On his Dysphagia and speech has improved with speech therapy.  He is currently on Sinemet  mg twice a day.  He follows up with neurology Dr. Brown.    Essential hypertension  His blood pressure has been adequately controlled.  Has been asymptomatic.  Is currently on losartan 50 mg daily.    Mixed hyperlipidemia  He has been tolerating the statin. Denies muscle pain LFTs has been normal, has been on atorvastatin 20 mg daily.    Gastroesophageal reflux disease without esophagitis  Currently denies any epigastric pain, heartburn, nausea, vomiting.  Patient could not tolerate Zantac, he will try to eat or he started to have epigastric pain and heartburn.  So he went back on Nexium 40 mg daily, and now he is asymptomatic.    Due for pneumonia vaccines.    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 Take at 7AM and 4PM 180 Tab 3   • esomeprazole (NEXIUM) 40 MG delayed-release capsule Take 1 Cap by mouth every day. 30 Cap 3   • Ropinirole HCl 2 MG TABLET SR 24 HR Take 3 Tabs by mouth every day. 270 Tab 3   • atorvastatin (LIPITOR) 20 MG Tab      • donepezil (ARICEPT) 5 MG Tab      • atorvastatin (LIPITOR) 20 MG Tab Take 1 Tab by mouth every day. 90 Tab 3   • losartan (COZAAR) 50 MG Tab Take 1 Tab by mouth every day.  "90 Tab 3   • raNITidine (ZANTAC) 150 MG Tab Take 1 Tab by mouth 2 times a day. (Patient not taking: Reported on 12/28/2018) 60 Tab 11   • AZOPT 1 % Suspension      • latanoprost (XALATAN) 0.005 % Solution      • aspirin (ASA) 81 MG Chew Tab chewable tablet Take 81 mg by mouth every day.     • Cholecalciferol 4000 units Cap Take  by mouth.       No current facility-administered medications for this visit.          Allergies as of 03/05/2019   • (No Known Allergies)        ROS: Denies any chest pain, Shortness of breath, Changes bowel or bladder, Lower extremity edema.    Physical Exam:  /74 (BP Location: Left arm, Patient Position: Sitting, BP Cuff Size: Adult)   Pulse 99   Temp 36.9 °C (98.5 °F)   Resp 16   Ht 1.676 m (5' 6\")   Wt 74.7 kg (164 lb 10.9 oz)   SpO2 95%   BMI 26.58 kg/m²   Gen.: Well-developed, well-nourished, no apparent distress,pleasant and cooperative with the examination  Skin:  Warm and dry with good turgor. No rashes or suspicious lesions in visible areas  HEENT:Sinuses nontender with palpation, TMs clear, nares patent with pink mucosa and clear rhinorrhea,no septal deviation ,polyps or lesions. lips without lesions, oropharynx clear.  Neck: Trachea midline,no masses or adenopathy. No JVD.  Cor: Regular rate and rhythm without murmur, gallop or rub.  Lungs: Respirations unlabored.Clear to auscultation with equal breath sounds bilaterally. No wheezes, rhonchi.  Extremities: No cyanosis, clubbing or edema.        Assessment and Plan.   78 y.o. male     1. Parkinson disease (HCC)  Stable.  Dysphagia and speech has improved with speech therapy  Continue on Sinemet  mg twice a day.  Continue follow-up with neurology Dr. Brown.    2. Essential hypertension  Adequately controlled.  Continue on losartan 50 mg daily.    3. Mixed hyperlipidemia  He has been tolerating the statin. Denies muscle pain LFTs has been normal  Continue on atorvastatin 20 mg daily.    4. Need for " vaccination  Due for pneumonia shot.  PCV 13 is given today.    - Prevnar 13 PCV-13    5. Gastroesophageal reflux disease without esophagitis  Patient could not tolerate Zantac, he went back on Nexium 40 mg daily.

## 2019-03-06 ENCOUNTER — PHYSICAL THERAPY (OUTPATIENT)
Dept: PHYSICAL THERAPY | Facility: REHABILITATION | Age: 79
End: 2019-03-06
Attending: FAMILY MEDICINE
Payer: MEDICARE

## 2019-03-06 ENCOUNTER — OCCUPATIONAL THERAPY (OUTPATIENT)
Dept: OCCUPATIONAL THERAPY | Facility: REHABILITATION | Age: 79
End: 2019-03-06
Attending: FAMILY MEDICINE
Payer: MEDICARE

## 2019-03-06 DIAGNOSIS — R26.89 BALANCE PROBLEM: ICD-10-CM

## 2019-03-06 DIAGNOSIS — G20.A1 PARKINSON DISEASE: ICD-10-CM

## 2019-03-06 PROCEDURE — 97530 THERAPEUTIC ACTIVITIES: CPT

## 2019-03-06 PROCEDURE — 97110 THERAPEUTIC EXERCISES: CPT | Mod: KX

## 2019-03-06 PROCEDURE — 97110 THERAPEUTIC EXERCISES: CPT

## 2019-03-06 NOTE — OP THERAPY DAILY TREATMENT
"  Outpatient Physical Therapy  DAILY TREATMENT     [facility] Physical Therapy [address]  [address]  [place] [address]  Phone:  [phone]  Fax:  [phone]    Date: 03/06/2019    Patient: Michael Packer  YOB: 1940  MRN: [id number]     Time Calculation  Start time: 0831  Stop time: 0900 Time Calculation (min): 29 minutes     Chief Complaint: Loss Of Balance    Visit #: 7    SUBJECTIVE: I didn't really have time to do the car transfers, plus it is hard to get into the car in the parking garage because all the cars are so close to each other. I feel like I can walk about the same amount. Per pt wife, \"I feel like he can walk further and is more willing to do it with me. He is going to get his hearing checked.\"    OBJECTIVE:          Therapeutic Exercises (CPT 43144):     1. Nu step , x5 min    2. Simulated car transfers, x10, Backwards entry with big steps to enter and exit    9. UPOC: 3/21/18      Time-based treatments/modalities:  Therapeutic exercise minutes (CPT 81894): 29 minutes      ASSESSMENT:   Response to treatment: Continued with car transfers today; reiterated practicing transfers in an open parking lot. Pt continues to have difficulty visualizing the mock car but improved with repetition. Pt was given two breaks this session; moderately fatigued at end of session. Spent additional time this session giving pt education and discussed discharge next session with pt and wife; both understand and are in agreement.     Pt education: Pt and wife educated regarding medicare cap and discharge planning for next session.     PLAN/RECOMMENDATIONS:   Plan for treatment: therapy treatment to continue next visit.  Planned interventions for next visit: continue with current treatment. Assess response to car transfer training. Review maintenance HEP. Discharge next visit.    Brady Espinoza, Student      "

## 2019-03-06 NOTE — OP THERAPY DAILY TREATMENT
"  Outpatient Occupational Therapy  DAILY TREATMENT     [facility] Occupational Therapy [address]  [address]  [place] [address]  Phone:  [phone]  Fax:  [phone]    Date: 03/06/2019    Patient: Michael Packer  YOB: 1940  MRN: [id number]     Time Calculation  Start time: 0800  Stop time: 0830 Time Calculation (min): 30 minutes     Chief Complaint: Hand Weakness (bilateral, dec FMC)    Visit #: 6    SUBJECTIVE: \"I feel good\", \"He's parking better per wife\"    OBJECTIVE:  Current objective measures:   SLUMS: 16/30        Therapeutic Exercises (CPT 27419):     1. Bilateral hands    Therapeutic Exercise Summary:  Gross extensor strengthening 10 reps with 5 second hold, isolated digit motor control \"flicking\" each finger from under thumb, rolling small plastic peg between thumb and IF, then thumb and MF multiple reps.     Therapeutic Treatments and Modalities:    1. Therapeutic Activities (CPT 56880)    Therapeutic Treatments and Modalities Summary: SLUMS: 16/30 with strongest deficits in memory and attention.  Reviewed naming of animals question where he was able to recall only 8 animals in 1 minute.  Discussed strategy of visualizing different environments to improve his ability to identify animals specific to that category (i.e. Farm, forest, safari)    Time-based treatments/modalities:  Therapeutic exercise minutes (CPT 83294): 15 minutes  Therapeutic activity minutes (CPT 47949): 15 minutes      ASSESSMENT:   Response to treatment:  Pt making progress with his bilateral hand strength and FMC for daily routine.  Cognitive deficits in memory and attention persist.  Reinforced recommendations for driving per previous visit as well as the need for hearing aids.  He is planning on getting new ones this Friday.  HEP updated with good understanding.    PLAN/RECOMMENDATIONS:   Plan for treatment: therapy treatment to continue next visit.  Planned interventions for next visit: " continue with current treatment, cognitive skill development (CPT 27989), functional training/self care (CPT 98459), therapeutic activities (CPT 59262) and therapeutic exercise (CPT 20819)

## 2019-03-12 ENCOUNTER — OCCUPATIONAL THERAPY (OUTPATIENT)
Dept: OCCUPATIONAL THERAPY | Facility: REHABILITATION | Age: 79
End: 2019-03-12
Attending: FAMILY MEDICINE
Payer: MEDICARE

## 2019-03-12 DIAGNOSIS — G20.A1 PARKINSON DISEASE: ICD-10-CM

## 2019-03-12 PROCEDURE — 97530 THERAPEUTIC ACTIVITIES: CPT

## 2019-03-12 PROCEDURE — 97110 THERAPEUTIC EXERCISES: CPT

## 2019-03-12 NOTE — OP THERAPY DAILY TREATMENT
"  Outpatient Occupational Therapy  DAILY TREATMENT     [facility] Occupational Therapy [address].  [address]  [place] [address]  Phone:  [phone]  Fax:  [phone]    Date: 03/12/2019    Patient: Michael Packer  YOB: 1940  MRN: [id number]     Time Calculation  Start time: 0100  Stop time: 0130 Time Calculation (min): 30 minutes     Chief Complaint: Hand Weakness (bilateral, decreased FMC)    Visit #: 7    SUBJECTIVE: \"I'm feeling pretty good, I've been doing my exercises\"    OBJECTIVE:  Current objective measures:   MVPT-3 Visual Closure Subtest:  10/13 correct in 8 minutes   strength:  Left: 54lbs; Right 58lbs         Therapeutic Exercises (CPT 32213):     1. Bilateral wrists/forearms    Therapeutic Exercise Summary:  Moderate resistance theraband PREs for bilateral wrist flexion/extension and supination/pronation 15-20 reps with min cues for positioning.  Issued written HEP.    Therapeutic Treatments and Modalities:    1. Therapeutic Activities (CPT 53806)    Therapeutic Treatments and Modalities Summary: MVPT-3 Visual Closure Subtest:  10/13 correct in 8 minutes    Time-based treatments/modalities:  Therapeutic exercise minutes (CPT 24606): 15 minutes  Therapeutic activity minutes (CPT 97948): 15 minutes        ASSESSMENT:   Response to treatment:  Pt making good progress today with his BUE strength and stability in response to graded exercises.  Pt demonstrated a passing score on the MVPT-3 Visual Closure Subtest compared to his original failing score of 6/10 correct.  Issued written HEP and theraband with good understanding.    PLAN/RECOMMENDATIONS:   Plan for treatment: therapy treatment to continue next visit.  Planned interventions for next visit: continue with current treatment, therapeutic activities (CPT 26830) and therapeutic exercise (CPT 84830)      "

## 2019-03-13 ENCOUNTER — PHYSICAL THERAPY (OUTPATIENT)
Dept: PHYSICAL THERAPY | Facility: REHABILITATION | Age: 79
End: 2019-03-13
Attending: FAMILY MEDICINE
Payer: MEDICARE

## 2019-03-13 DIAGNOSIS — G20.A1 PARKINSON DISEASE: ICD-10-CM

## 2019-03-13 DIAGNOSIS — R26.89 BALANCE PROBLEM: ICD-10-CM

## 2019-03-13 PROCEDURE — 97110 THERAPEUTIC EXERCISES: CPT

## 2019-03-13 NOTE — OP THERAPY DISCHARGE SUMMARY
Outpatient Physical Therapy  DISCHARGE SUMMARY NOTE      [facility] Physical Therapy [address]  [address]  [place] [address]  Phone:  [phone]  Fax:  [phone]    Date of Visit: 03/13/2019    Patient: Michael Packer  YOB: 1940  MRN: [id number]     Referring Provider: Edwar Beal M.D.  [address] [place] [address] [address]  [place] [address]   Referring Diagnosis Other abnormalities of gait and mobility [R26.89]     Physical Therapy Occurrence Codes    Date of onset of impairment:  1/24/15   Date physical therapy care plan established or reviewed:  1/24/19   Date physical therapy treatment started:  1/24/19          Functional Limitations and Severity Modifiers        Your patient is being discharged from Physical Therapy with the following comments:   · Goals met    Comments:  Objective measures:   Mini-BESTest   Total score: 23/26 (Eval: 20/26)   Subscores:    Anticipatory: 4/6 (Eval: 4/6)    Reactive Postural control: 6/6 (Eval: 5/6)    Sensory orientation: 6/6 (Eval: 6/6)    Dynamic gait (excluding TUG +Dual tasking): 7/10 (Eval: 5/8)     Short Term Goals:   1. Patient will be independent with HEP (MET)  Short term goal time span:  2-4 weeks      Long Term Goals:    1. Patient will be independent with HEP maintenance program. (MET)  2. Patient will receive a Mini-BESTest score of 24/26 excluding the dynamic TUG portion. (Almost MET; final score 23/26).  3. Patient will walk 25 minutes or greater withleast restrictive AD without having to rest due to fatigue. (MET)  4. Patient will perform one step for recovery in backward and lateral compensatory stepping (MET) corrections to improve balance.   Long term goal time span:  6-8 weeks    Patient has increased endurance for walking duration, improved chair to stand transfers, and has improved with car transfers. Also improved with SLS to be able to navigate stairs and stepping over obstacles.     Limitations  Remaining:  Still has limitations of B SLS and raising up on toes based on Mini-BESTest. Functionally, still has some challenges with car transfers but is improving with HEP and repeated cuing from spouse.       Recommendations:  Recommend pt discharge from PT today and continue with maintenance HEP. Answered all patient questions this session and reviewed current HEP. Educated about conserving Medicare visits based on meeting current goals and returning to therapy should function change.       Brady Espinoza, Student    Date: 3/13/2019

## 2019-03-13 NOTE — OP THERAPY DAILY TREATMENT
Outpatient Physical Therapy  DAILY TREATMENT     [facility] Physical [address].  [address]  [place] [address]  Phone:  [phone]  Fax:  [phone]    Date: 03/13/2019    Patient: Michael Packer  YOB: 1940  MRN: [id number]     Time Calculation  Start time: 1030  Stop time: 1110 Time Calculation (min): 40 minutes     Chief Complaint: Difficulty Walking and Loss Of Balance    Visit #: 8    SUBJECTIVE: I feel okay today. I'm a little sad therapy is ending.       OBJECTIVE:  Current objective measures: See discharge note.        Exercises/Treatment  Time-based treatments/modalities:  Therapeutic exercise minutes (CPT 76816): 40 minutes       ASSESSMENT:   Response to treatment: See discharge note    PLAN/RECOMMENDATIONS:   Plan for treatment: See discharge note.  Planned interventions for next visit: Discharge today.    Brady Espinoza, Student

## 2019-03-15 ENCOUNTER — OFFICE VISIT (OUTPATIENT)
Dept: PULMONOLOGY | Facility: HOSPICE | Age: 79
End: 2019-03-15
Payer: MEDICARE

## 2019-03-15 VITALS
TEMPERATURE: 97.9 F | BODY MASS INDEX: 26.2 KG/M2 | OXYGEN SATURATION: 90 % | RESPIRATION RATE: 16 BRPM | HEIGHT: 66 IN | SYSTOLIC BLOOD PRESSURE: 132 MMHG | HEART RATE: 84 BPM | WEIGHT: 163 LBS | DIASTOLIC BLOOD PRESSURE: 70 MMHG

## 2019-03-15 DIAGNOSIS — J84.9 ILD (INTERSTITIAL LUNG DISEASE) (HCC): ICD-10-CM

## 2019-03-15 DIAGNOSIS — G47.33 OSA (OBSTRUCTIVE SLEEP APNEA): ICD-10-CM

## 2019-03-15 DIAGNOSIS — G20.A1 PARKINSON DISEASE: ICD-10-CM

## 2019-03-15 PROCEDURE — 99214 OFFICE O/P EST MOD 30 MIN: CPT | Performed by: INTERNAL MEDICINE

## 2019-03-15 ASSESSMENT — PAIN SCALES - GENERAL: PAINLEVEL: NO PAIN

## 2019-03-18 NOTE — PROGRESS NOTES
Chief Complaint   Patient presents with   • Follow-Up     Cough       HPI:  The patient is a 78-year-old man who moved into this area about recently from [place].  He has a history of cough for a number of years.  His cough is usually nonproductive.  He has never had any hemoptysis.  He says he has had wheezing on occasion.  He does get short of breath with prolonged walking.  He has Parkinson's disease and is not very active.  He is a never smoker.  In [place] he was followed by Dr. Aparicio.  A chest CT done on 3/15/2017 revealed patchy subpleural reticulation in the dependent portions of both lower lobes.  It was felt to represent scarring, chronic aspiration, or early interstitial lung disease.  There was no evidence of consolidation or nodules.  His pulmonary function testing in May 2018 demonstrated an FEV1 of 2.47 L which is 99% of predicted.  His TLC was 88% of predicted.  However his DLCO is reduced at 55% of predicted.  A 6-minute walk demonstrated oxygen desaturation down to 78% at the end of 6 minutes.  The patient declined supplemental oxygen.  The patient also was diagnosed with obstructive sleep apnea.  He was started on CPAP at 8 cm of water pressure.  He tends to wear his CPAP for naps in the afternoon.  However, he cannot tolerate the CPAP at night.  When he wears his equipment is resulting AHI is 1.1 events per hour.  The patient is not complaining of significant shortness of breath at rest or with his usual activity.  He says that he does not choke on his food.  He denies any aspiration.  He does have a history of gastroesophageal reflux and is on Nexium.    We received the CT images from [place].  He has very minimal peripheral fibrotic change.  We also were able to review his sleep study.  His obstructive sleep apnea is very mild and he has minimal oxygen desaturation.  On his study he spent 0.1% of the time with saturations less than 89%.  His minimum saturation was 87%.    After his  last visit we did refer him to speech therapy because of the risk of aspiration.  He was evaluated by speech therapy and also referred to physical therapy and occupational therapy.  He and his wife believe that the therapy interventions have been very helpful.  He does not wish to use CPAP or supplemental oxygen.    Past Medical History:   Diagnosis Date   • Chickenpox    • Daytime sleepiness    • GERD (gastroesophageal reflux disease)    • Glaucoma    • Heartburn    • Hyperlipidemia    • Hypertension    • Kidney stone    • Mumps    • Muscle disorder    • Sleep apnea    • Wears glasses        ROS:   Constitutional: Denies fevers, chills, night sweats, fatigue or weight loss  Eyes: Denies vision loss, pain, drainage, double vision  Ears, Nose, Throat: Denies earache, tinnitus, hoarseness  Cardiovascular: Denies chest pain, tightness, palpitations  Respiratory: Denies shortness of breath, sputum production, cough, hemoptysis  Sleep: Denies, snoring, apnea  GI: Denies abdominal pain, nausea, vomiting, diarrhea  : Denies frequent urination, hematuria, painful urination  Musculoskeletal: Denies back pain, painful joints, sore muscles  Neurological: Denies headaches, seizures.  He has Parkinson's disease  Skin: Denies rashes, color changes  Psychiatric: Denies depression or thoughts of suicide  Hematologic: Denies bleeding tendency or clotting tendency  Allergic/Immunologic: Denies rhinitis, skin sensitivity    Social History     Social History   • Marital status:      Spouse name: N/A   • Number of children: N/A   • Years of education: N/A     Occupational History   • Not on file.     Social History Main Topics   • Smoking status: Never Smoker   • Smokeless tobacco: Never Used   • Alcohol use Yes      Comment: very very rarely   • Drug use: No   • Sexual activity: Not on file     Other Topics Concern   • Not on file     Social History Narrative   • No narrative on file     Patient has no known  "allergies.  Current Outpatient Prescriptions on File Prior to Visit   Medication Sig Dispense Refill   • carbidopa-levodopa (SINEMET)  MG Tab Take 1 Tab by mouth 2 times a day. Take at 7AM and 4PM 180 Tab 3   • esomeprazole (NEXIUM) 40 MG delayed-release capsule Take 1 Cap by mouth every day. 30 Cap 3   • Ropinirole HCl 2 MG TABLET SR 24 HR Take 3 Tabs by mouth every day. 270 Tab 3   • donepezil (ARICEPT) 5 MG Tab      • atorvastatin (LIPITOR) 20 MG Tab Take 1 Tab by mouth every day. 90 Tab 3   • losartan (COZAAR) 50 MG Tab Take 1 Tab by mouth every day. 90 Tab 3   • AZOPT 1 % Suspension      • latanoprost (XALATAN) 0.005 % Solution      • aspirin (ASA) 81 MG Chew Tab chewable tablet Take 81 mg by mouth every day.     • Cholecalciferol 4000 units Cap Take  by mouth.     • atorvastatin (LIPITOR) 20 MG Tab      • raNITidine (ZANTAC) 150 MG Tab Take 1 Tab by mouth 2 times a day. (Patient not taking: Reported on 3/15/2019) 60 Tab 11     No current facility-administered medications on file prior to visit.      Blood pressure 132/70, pulse 84, temperature 36.6 °C (97.9 °F), temperature source Oral, resp. rate 16, height 1.676 m (5' 6\"), weight 73.9 kg (163 lb), SpO2 90 %.  Family History   Problem Relation Age of Onset   • Lung Disease Father    • Diabetes Paternal Grandfather        Physical Exam:    HEENT: PERRLA, EOMI, no scleral icterus, no nasal or oral lesions  Neck: No thyromegaly, no adenopathy, no bruits  Mallampatti: Grade II  Lungs: Equal breath sounds, no wheezes or crackles  Heart: Regular rate and rhythm, no gallops or murmurs  Abdomen: Soft, benign, no organomegaly  Extremities: No clubbing, cyanosis, or edema  Neurologic: Cranial nerve, motor, and sensory exam are normal.  He does have a resting tremor and minimal facial expression.    1. Parkinson disease (HCC)    2. GENIA (obstructive sleep apnea)    3. ILD (interstitial lung disease) ([facility])        At this point his interstitial lung disease is " minimal.  His obstructive sleep apnea is very mild.  He does not wish to use CPAP or supplemental oxygen.  He will continue on Nexium.  We will see him in the future on an as-needed basis.

## 2019-03-25 ENCOUNTER — APPOINTMENT (OUTPATIENT)
Dept: SLEEP MEDICINE | Facility: MEDICAL CENTER | Age: 79
End: 2019-03-25
Payer: MEDICARE

## 2019-03-26 ENCOUNTER — OCCUPATIONAL THERAPY (OUTPATIENT)
Dept: OCCUPATIONAL THERAPY | Facility: REHABILITATION | Age: 79
End: 2019-03-26
Attending: FAMILY MEDICINE
Payer: MEDICARE

## 2019-03-26 DIAGNOSIS — G20.A1 PARKINSON DISEASE: ICD-10-CM

## 2019-03-26 PROCEDURE — 97530 THERAPEUTIC ACTIVITIES: CPT

## 2019-03-26 PROCEDURE — 97110 THERAPEUTIC EXERCISES: CPT

## 2019-03-26 NOTE — OP THERAPY DAILY TREATMENT
"  Outpatient Occupational Therapy  DAILY TREATMENT     [facility] Occupational [address].  [address]  [place] [address]  Phone:  [phone]  Fax:  [phone]    Date: 03/26/2019    Patient: Michael Packer  YOB: 1940  MRN: [id number]     Time Calculation  Start time: 0800  Stop time: 0830 Time Calculation (min): 30 minutes     Chief Complaint: Hand Weakness (decreased motor control)    Visit #: 8    SUBJECTIVE: \"My hands feel stronger\"    OBJECTIVE:  Current objective measures:    strength:  Left: 54lbs, Right: 63lbs        Therapeutic Exercises (CPT 64039):     1. BH    Therapeutic Exercise Summary:  Intrinsic hand strengthening using rubber band between thumb and IF, then thumb and IF/MF for extension multiple reps with 3 second holds.  Chair push-ups 10 reps of 10 second hold.    Therapeutic Treatments and Modalities:    1. Therapeutic Activities (CPT 82500)    Therapeutic Treatments and Modalities Summary: Sliding cards over edge of table and flip over one hand at a time and then simultaneously using BH, pulling individual cards from deck with rubber band over it and turning it over alternating thumb with each digit.    Time-based treatments/modalities:  Therapeutic exercise minutes (CPT 07309): 15 minutes  Therapeutic activity minutes (CPT 42156): 15 minutes      ASSESSMENT:   Response to treatment: Pt continues to make steady progress with his bilateral hand strength and motor control for ADLs and IADLs in response to graded activities.  HEP updated with good understanding.  Anticipate d/c to HEP next visit.    PLAN/RECOMMENDATIONS:   Plan for treatment: therapy treatment to continue next visit.  Planned interventions for next visit: continue with current treatment, therapeutic activities (CPT 39144) and therapeutic exercise (CPT 30422)      "

## 2019-03-28 ENCOUNTER — OCCUPATIONAL THERAPY (OUTPATIENT)
Dept: OCCUPATIONAL THERAPY | Facility: REHABILITATION | Age: 79
End: 2019-03-28
Attending: FAMILY MEDICINE
Payer: MEDICARE

## 2019-03-28 DIAGNOSIS — G20.A1 PARKINSON DISEASE: ICD-10-CM

## 2019-03-28 PROCEDURE — 97110 THERAPEUTIC EXERCISES: CPT

## 2019-03-28 NOTE — OP THERAPY DAILY TREATMENT
"  Outpatient Occupational Therapy  DAILY TREATMENT     [facility] Occupational Therapy [address].  [address]  [place] [address]  Phone:  [phone]  Fax:  [phone]    Date: 03/28/2019    Patient: Michael Packer  YOB: 1940  MRN: [id number]     Time Calculation  Start time: 0900  Stop time: 0930 Time Calculation (min): 30 minutes     Chief Complaint: Hand Weakness (bilateral, decreased FMC)    Visit #: 9    SUBJECTIVE: \"I feel great\"    OBJECTIVE:  Current objective measures:   BUE AROM/Strength: WNL   strength: Left: 50lbs; Right: 65lbs  9-Hole Peg Test:  LH: 26 seconds; RH: 27 seconds  Motor control: WFL with mild tremors, R > L  ADLs: independent, able to manage buttons without difficulty, independent don/doff jacket.  Able to transport objects from bottom to top shelves without difficulty  Balance: standing static/dynamic: Good/Good  Cognition: WFL with mild deficits in attention and memory  Driving: pt driving with wife without concerns under the recommendations of driving only during the day, familiar routes, good weather conditions, avoidance of the freeway, without distractions, and always with his wife as the passenger.  HEP: Independent with theraputty, motor control exercises, tongue depressor, digit extension strengthening, chair push-ups, and theraband.        Therapeutic Exercises (CPT 60458):     1.     Therapeutic Exercise Summary:  Reviewed and performed examples of all exercises for his HEP with good understanding.  Re-tested all areas noted above.      Time-based treatments/modalities:  Therapeutic exercise minutes (CPT 25899): 30 minutes      ASSESSMENT:   Response to treatment: Pt has actively participated in skilled OT program and has made excellent progress to fully meet LTGs.  Pt is independent ADLs and basic IADLs with good strength, balance, and motor control.  While pt continues with mild deficits overall in attention and memory, he " demonstrated good safety awareness, divided attention, and anticipatory skills during his performance on the driving simulator.  See daily note from 3/4/19 for details.  Pt is safe to drive under the conditions agreed upon with his wife described above.  Pt/wife are independent with his HEP and he is safe for d/c.      PLAN/RECOMMENDATIONS:   Plan for treatment: discharge patient due to accomplished goals.  Planned interventions for next visit: D/C to HEP.  D/C OT.

## 2019-03-29 NOTE — OP THERAPY DISCHARGE SUMMARY
Outpatient Occupational Therapy  DISCHARGE SUMMARY NOTE    [facility] Occupational Therapy [place] [id number] [facility].  [address]  [place] [address]  Phone:  [phone]  Fax:  [phone]    Date of Visit: 03/28/2019    Patient: Michael Packer  YOB: 1940  MRN: [id number]     Referring Provider: Edwar Beal M.D.  [address] [place] [address] [address]  [place] [address]   Referring Diagnosis: Parkinson's disease [G20]     Occupational Therapy Occurrence Codes    Date of onset of impairment:  1/24/15   Date of occupational therapy care plan established or reviewed:  2/7/19   Date of occupational therapy treatment started:  2/7/19          Functional Limitations and Severity Modifiers  OT Functional Assessment Tool Used: 9-Hole Peg Test  OT Functional Assessment Score: LH: 26 seconds; RH: 27 seconds   Goal:     Discharge:         Your patient is being discharged from Occupational Therapy with the following comments:   · Goals met    Comments:  Pt has actively participated in skilled OT program and has made excellent progress to fully meet LTGs.  Pt is independent ADLs and basic IADLs with good strength, balance, and motor control.  While pt continues with mild deficits overall in attention and memory, he demonstrated good safety awareness, divided attention, and anticipatory skills during his performance on the driving simulator.  See daily note from 3/4/19 for details.  Pt is safe to drive under the conditions agreed upon with his wife described above.  Pt/wife are independent with his HEP and he is safe for d/c.      Limitations Remaining:  See daily note from 3/28/19 for details.    Recommendations:  D/C to HEP.  D/C OT.    Charlie Whitaker MS,OTR/L    Date: 3/28/2019

## 2019-04-09 ENCOUNTER — OFFICE VISIT (OUTPATIENT)
Dept: NEUROLOGY | Facility: MEDICAL CENTER | Age: 79
End: 2019-04-09
Payer: MEDICARE

## 2019-04-09 VITALS
HEART RATE: 82 BPM | DIASTOLIC BLOOD PRESSURE: 74 MMHG | BODY MASS INDEX: 26.61 KG/M2 | SYSTOLIC BLOOD PRESSURE: 112 MMHG | RESPIRATION RATE: 16 BRPM | WEIGHT: 165.6 LBS | TEMPERATURE: 97.3 F | HEIGHT: 66 IN

## 2019-04-09 DIAGNOSIS — F02.80 DEMENTIA ASSOCIATED WITH OTHER UNDERLYING DISEASE WITHOUT BEHAVIORAL DISTURBANCE (HCC): ICD-10-CM

## 2019-04-09 DIAGNOSIS — G20.A1 PARKINSON DISEASE: Primary | ICD-10-CM

## 2019-04-09 PROCEDURE — 99214 OFFICE O/P EST MOD 30 MIN: CPT | Performed by: PSYCHIATRY & NEUROLOGY

## 2019-04-09 RX ORDER — ROPINIROLE 2 MG/1
6 TABLET, FILM COATED, EXTENDED RELEASE ORAL DAILY
Qty: 270 TAB | Refills: 3 | OUTPATIENT
Start: 2019-04-09 | End: 2019-12-10 | Stop reason: SINTOL

## 2019-04-09 ASSESSMENT — ENCOUNTER SYMPTOMS
MEMORY LOSS: 1
DIZZINESS: 0
INSOMNIA: 0
FALLS: 0
SEIZURES: 0
TREMORS: 1
NERVOUS/ANXIOUS: 0
LOSS OF CONSCIOUSNESS: 0
CONSTIPATION: 0

## 2019-04-09 ASSESSMENT — PAIN SCALES - GENERAL: PAINLEVEL: NO PAIN

## 2019-04-09 NOTE — PROGRESS NOTES
Subjective:      Michael Packer is a 78 y.o. 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    HPI    Parkinson's disease: Since last seen, we had continued his Sinemet 25/100, twice daily dosing, I added ropinirole ER 2 mg tablets, expecting him to take 6 mg once a day, he is now taking it is a 2 mg, 3 times daily regimen.  With this, he denies fluctuations, wearing-off for peak-dose phenomena.  He tolerates both drugs without issue.    Overall, neither he nor Cecilia are overly convinced there is significant improvement, but still he moves more easily, he has not been falling, there is still a little slowness with any type of movement initiation, this is certainly not worsened.  The tremor with the right hand worsens less frequently.  There is less loss of dexterity.  Appetite and weight are stable, he is swallowing easily, voice volume and quality are maintained.  He is sleeping well.  He is walking on a regular basis, much to his chagrin.  Bowel and bladder function are stable, he does not suffer from issues involving constipation or urinary retention.  He denies symptoms or signs of dysautonomia    He did follow through with a pulmonologist, they had recommended speech therapy because of concerns of aspiration, they directed him to occupational and physical therapies, all of this has helped him notably, he has brightened mentally as well.    Cognitive impairment: Though we had talked about discontinuing Aricept, I had wanted to wait until we had the motor symptom treatment under better control.  On his own, he stopped the drug, in fact things are doing better following his therapies, as above, there certainly was no deterioration.  There is still some of the mental slowing, some difficulty with distractibility.  He still can get lost in mid thought, he does find himself not participating as often.    Medical,  "surgical and family histories are reviewed in the electronic health record, there are no new drug allergies.  Other than his Sinemet and Requip ER, he also is on baby aspirin daily, Cozaar, Nexium, Lipitor and Zantac.    Review of Systems   Constitutional: Negative for malaise/fatigue.   Gastrointestinal: Negative for constipation.   Genitourinary: Negative.    Musculoskeletal: Negative for falls.   Neurological: Positive for tremors. Negative for dizziness, seizures and loss of consciousness.   Psychiatric/Behavioral: Positive for memory loss. The patient is not nervous/anxious and does not have insomnia.    All other systems reviewed and are negative.       Objective:     /74 (BP Location: Right arm, Patient Position: Sitting)   Pulse 82   Temp 36.3 °C (97.3 °F)   Resp 16   Ht 1.676 m (5' 6\")   Wt 75.1 kg (165 lb 9.6 oz)   BMI 26.73 kg/m²      Physical Exam    He appears in no acute distress.  Vital signs are stable.  There is no malar rash or sialorrhea.  The neck is supple, range of motion is full.  Cardiac evaluation reveals a regular rhythm.  There is no lower extremity edema.    He is fully oriented, there is no aphasia, apraxia, or inattention.  Mental processing speed is slowed, he has notable difficulty recounting details, he uses a list to recount his medications, but he still repeats himself.    PERRLA/EOMI, there is some mild hypophonia, bradykinesia, but no tachyphemia or dysarthria.  Visual fields are full.  Sensory exam is intact to temperature bilaterally.  The tongue and uvula are midline.    There is a minimal resting tremor with the right hand, rigidity is improved bilaterally, still more pronounced on the right.  Overall, bradykinesia is slightly better.  There is no drift.  Strength is intact throughout.  There are no pathologic reflexes.    He stands slowly but does so independently, there is only slight hesitancy on gait initiation, stride length with the right leg is only " minimally diminished when compared to the left.  He still requires more than 4 steps to turn.  Right arm swing is diminished when compared to the left.  There is no appendicular dystaxia.  Repetitive movements in the hands do show some diminished amplitude bilaterally.    Sensory exam is intact to vibration.     Assessment/Plan:     1. Parkinson disease (HCC)  For ease sake, I recommended he take his Requip at once in the morning, a single 6 mg dose.  This helps with compliance.  We talked about side effects that can occur, usually these attenuate the longer he is on a stable dosing regimen.  The rationale for this was reviewed.  Sinemet will be continued.  His disease is mild, he is doing very well symptomatically.  Regular, aerobic physical exercise is critical to delay the need for more medication, he was told about this.    2. Dementia associated with other underlying disease without behavioral disturbance  He does not yet really fit criteria for actual dementia, though he does have cognitive symptoms.  For now we can keep him off medication, though we talked at length about the nature of the symptoms that might be part of this illness, medications that are used and the benefits they provide.  A 6-month follow-up is scheduled.    Time: 25 minutes spent face-to-face for exam, review, discussion, and education, of this over 50% of the time spent counseling and coordinating care surrounding all of the above issues.

## 2019-05-14 RX ORDER — ESOMEPRAZOLE MAGNESIUM 40 MG/1
CAPSULE, DELAYED RELEASE ORAL
Qty: 90 CAP | Refills: 3 | Status: SHIPPED | OUTPATIENT
Start: 2019-05-14 | End: 2020-01-01

## 2019-08-01 ENCOUNTER — OFFICE VISIT (OUTPATIENT)
Dept: MEDICAL GROUP | Facility: MEDICAL CENTER | Age: 79
End: 2019-08-01
Payer: MEDICARE

## 2019-08-01 VITALS
TEMPERATURE: 98.5 F | HEIGHT: 66 IN | SYSTOLIC BLOOD PRESSURE: 122 MMHG | HEART RATE: 87 BPM | RESPIRATION RATE: 16 BRPM | DIASTOLIC BLOOD PRESSURE: 70 MMHG | BODY MASS INDEX: 26.5 KG/M2 | WEIGHT: 164.9 LBS | OXYGEN SATURATION: 95 %

## 2019-08-01 DIAGNOSIS — E78.2 MIXED HYPERLIPIDEMIA: ICD-10-CM

## 2019-08-01 DIAGNOSIS — L98.9 SKIN LESION: ICD-10-CM

## 2019-08-01 DIAGNOSIS — I10 ESSENTIAL HYPERTENSION: ICD-10-CM

## 2019-08-01 DIAGNOSIS — G20.A1 PARKINSON DISEASE: ICD-10-CM

## 2019-08-01 PROCEDURE — 99214 OFFICE O/P EST MOD 30 MIN: CPT | Performed by: FAMILY MEDICINE

## 2019-08-01 NOTE — PROGRESS NOTES
CC: Parkinson disease, hypertension, hyperlipidemia, skin lesions    HPI:   Michael presents today discuss the following medical issues:    Parkinson disease (HCC)  Patient has a mild variant of Parkinson disease but has not been affecting his quality of life yet.  Has been following up with neurology in a regular basis, recommended to take Requip 6 mg daily besides Sinemet  mg twice a day.  Denies any side effects from the medication.     Essential hypertension  Blood pressure has been adequately controlled.  Denies headache, chest pain, shortness of breath.  He is currently on losartan 50 mg daily.  No side effects.     Mixed hyperlipidemia  He has been tolerating the statin. Denies muscle pain LFTs has been normal, has been on atorvastatin 20 mg daily.     Skin lesion  Patient has multiple lesions on the back, and some on the right side of his face.  Most of them are seborrheic keratosis, there is one suspicious black rounded lesion, but he denies any itching or bleeding from any of the lesions on the back.  His wife stated that all of this lesions has been same size for many years.    Patient Active Problem List    Diagnosis Date Noted   • Skin lesion 08/01/2019   • Parkinson disease (HCC) 08/13/2018   • Essential hypertension 08/13/2018   • Mixed hyperlipidemia 08/13/2018   • GENIA (obstructive sleep apnea) 08/13/2018   • Dementia associated with other underlying disease without behavioral disturbance 08/13/2018   • Incontinence of feces 08/13/2018       Current Outpatient Medications   Medication Sig Dispense Refill   • esomeprazole (NEXIUM) 40 MG delayed-release capsule TAKE 1 CAPSULE DAILY 90 Cap 3   • Ropinirole HCl 2 MG TABLET SR 24 HR Take 3 Tabs by mouth every day. 270 Tab 3   • carbidopa-levodopa (SINEMET)  MG Tab Take 1 Tab by mouth 2 times a day. Take at 7AM and 4PM 180 Tab 3   • atorvastatin (LIPITOR) 20 MG Tab Take 1 Tab by mouth every day. 90 Tab 3   • losartan (COZAAR) 50 MG Tab Take 1  "Tab by mouth every day. 90 Tab 3   • raNITidine (ZANTAC) 150 MG Tab Take 1 Tab by mouth 2 times a day. 60 Tab 11   • AZOPT 1 % Suspension      • latanoprost (XALATAN) 0.005 % Solution      • aspirin (ASA) 81 MG Chew Tab chewable tablet Take 81 mg by mouth every day.     • Cholecalciferol 4000 units Cap Take  by mouth.       No current facility-administered medications for this visit.          Allergies as of 08/01/2019   • (No Known Allergies)        ROS: Denies any chest pain, Shortness of breath, Changes bowel or bladder, Lower extremity edema.    Physical Exam:  /70 (BP Location: Right arm, Patient Position: Sitting, BP Cuff Size: Adult)   Pulse 87   Temp 36.9 °C (98.5 °F) (Temporal)   Resp 16   Ht 1.676 m (5' 6\")   Wt 74.8 kg (164 lb 14.5 oz)   SpO2 95%   BMI 26.62 kg/m²   Gen.: Well-developed, well-nourished, no apparent distress,pleasant and cooperative with the examination  Skin:  Warm and dry with good turgor.  Multiple rounded scaly region on the back, and right side of the face.  Has rounded 2.5 cm x 2.5 cm scaly black lesion on the back.  HEENT:Sinuses nontender with palpation, TMs clear, nares patent with pink mucosa and clear rhinorrhea,no septal deviation ,polyps or lesions. lips without lesions, oropharynx clear.  Neck: Trachea midline,no masses or adenopathy. No JVD.  Cor: Regular rate and rhythm without murmur, gallop or rub.  Lungs: Respirations unlabored.Clear to auscultation with equal breath sounds bilaterally. No wheezes, rhonchi.  Extremities: No cyanosis, clubbing or edema.          Assessment and Plan.   79 y.o. male     1. Parkinson disease (HCC)  Stable.  Continue on Sinemet  mg twice a day.  Continue Requip 6 mg daily  Continue follow-up with neurology Dr. Brown.     2. Essential hypertension  Adequately controlled.  Continue on losartan 50 mg daily.     3. Mixed hyperlipidemia  He has been tolerating the statin. Denies muscle pain LFTs has been normal  Continue on " atorvastatin 20 mg daily.     4. Skin lesion  Multiple lesions on the back, most of them are seborrheic keratosis, there is one suspicious lesion need to be evaluated by dermatologist.    - REFERRAL TO DERMATOLOGY

## 2019-10-10 ENCOUNTER — OFFICE VISIT (OUTPATIENT)
Dept: NEUROLOGY | Facility: MEDICAL CENTER | Age: 79
End: 2019-10-10
Payer: MEDICARE

## 2019-10-10 VITALS
TEMPERATURE: 97.2 F | HEIGHT: 66 IN | WEIGHT: 166 LBS | DIASTOLIC BLOOD PRESSURE: 78 MMHG | SYSTOLIC BLOOD PRESSURE: 120 MMHG | HEART RATE: 96 BPM | BODY MASS INDEX: 26.68 KG/M2 | OXYGEN SATURATION: 98 %

## 2019-10-10 DIAGNOSIS — G20.A1 PARKINSON DISEASE: Primary | ICD-10-CM

## 2019-10-10 DIAGNOSIS — F02.80 DEMENTIA ASSOCIATED WITH OTHER UNDERLYING DISEASE WITHOUT BEHAVIORAL DISTURBANCE (HCC): ICD-10-CM

## 2019-10-10 PROCEDURE — 99214 OFFICE O/P EST MOD 30 MIN: CPT | Performed by: PSYCHIATRY & NEUROLOGY

## 2019-10-10 ASSESSMENT — ENCOUNTER SYMPTOMS
HALLUCINATIONS: 0
CONSTIPATION: 0
MEMORY LOSS: 1
FOCAL WEAKNESS: 0
SPEECH CHANGE: 0
FALLS: 0
TREMORS: 1
INSOMNIA: 0

## 2019-10-10 NOTE — PROGRESS NOTES
Subjective:      Eirc Packer is a 79 y.o. 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     HPI    Eric states that he is doing fairly well on his regimen of Requip ER 2 mg 3 times daily and Sinemet 25/100, twice daily with the first and third doses of the former.  He did try to take all 3 of the Requip tablets, a total of 6 mg, in the morning, there was clear wearing-off at the end of the day.  Always a little stiffer in the morning, within an hour or so the drug works and the only wearing-often occurs towards the end of the afternoon before his last dose of medications.  The bigger issue is simply a fatigue that is hitting him now in the later part of the morning when they are out on their walk, as well as towards the end of the day.  He is still walking but he has to and there walks sooner.  He has not been falling.  He uses a cane rarely.    From day today things are about the same, there are no peak-dose dyskinesias or akathisia's, hallucinations, or dystonia.  There are no real fluctuations, on/off episodes, he does not freeze when he stands.  Appetite and weight are stable, he does not drool, swallowing is done easily.  He is sleeping well, there are no issues with significant hallucinations.    The cognitive symptoms are still there, this is the most frustrating thing for both of them.  Cecilia does need to help with his medications, otherwise he is independent with his ADLs, it simply takes him longer to complete them.  There have been no real changes in his behavior and personality.  It is still difficult with multitasking, keeping up with conversation is always a little bit more difficult.  Short-term memory also curtailed to a degree.  He denies significant feelings of depression, Cecilia states he may feel a little more withdrawn but there is nothing significant.    Medical, surgical and  "family histories are reviewed in the electronic health record, there are no new drug allergies.  He is on Requip SR 2 mg 3 times a day, Sinemet 25/100, twice daily, Cozaar, Nexium and Lipitor.    Review of Systems   Constitutional: Positive for malaise/fatigue.   Gastrointestinal: Negative for constipation.   Musculoskeletal: Negative for falls.   Neurological: Positive for tremors. Negative for speech change and focal weakness.   Psychiatric/Behavioral: Positive for memory loss. Negative for hallucinations. The patient does not have insomnia.    All other systems reviewed and are negative.       Objective:     /78 (BP Location: Left arm, Patient Position: Sitting, BP Cuff Size: Adult)   Pulse 96   Temp 36.2 °C (97.2 °F) (Temporal)   Ht 1.676 m (5' 6\")   Wt 75.3 kg (166 lb)   SpO2 98%   BMI 26.79 kg/m²      Physical Exam    As always, he appears no acute distress.  Vital signs are stable.  There is no malar rash or sialorrhea.  The neck is supple.  Cardiac evaluation reveals a regular rhythm.  There is no lower extremity edema.    Always a little quiet and withdrawn, he does participate in conversation when directly addressed.  His answers are quite sustained and without embellishment though they are appropriate.  He names and repeats, he is fully oriented.    PERRLA/EOMI, visual fields are full, there is hypophonia, no dysarthria or tachyphemia.  Facial movements are symmetric though there is a slight droop of the right nasolabial fold at rest.  Sensory exam is intact to temperature.    Musculoskeletal exam again reveals a resting tremor with the right hand, rigidity more on the right side than left.  There is generalized bradykinesia to a slight degree.  There is no drift or asterixis.  Strength is intact in all 4 extremities.    He stands slowly but can do so without the use of an assistive device.  He walks with a slight reduction in stride on the right only, no worse than before.  He does not " "shuffle.  He still does require multiple steps to turn, he does so en bloc.  There is no appendicular dystaxia.  Repetitive movements are slightly diminished in amplitude with the right hand and foot when compared to the left.  Sensory exam is grossly intact to light touch.     Assessment/Plan:     1. Parkinson disease (HCC)  Though there might be a slight worsening with all of his motor symptoms, I am still uncomfortable adding more medication, micromanaging the disease is a little bit problematic.  When symptoms are more noticeable and dysfunctional, then we will add medicine.  Already susceptible to hallucinations and drowsiness, the latter part of the disease itself, I would try Eldepryl in the morning and midday to see if we can get some benefit, without having to add more dopamine, and activation as the drug has some stimulant effect.  Amantadine would be another option.  For now we will maintain the Requip and Sinemet regimen unchanged.    2. Dementia associated with other underlying disease without behavioral disturbance (HCC)  Though there are some cognitive symptoms, maybe a little worse, I do not think adding Exelon at this time is really going to prove beneficial.  I would rather he simply get out and exercise, continue to work his cognitive \"exercises\" without the need for chemical intervention.  They both understand the cognitive symptoms are part of his disease, and I do not think he is actually depressed.  I do not think this is related to the medications and side effects.    Face-to-face time was spent reviewing all the above.  We will play phone contact in the interim, follow-up in 6 months.    "

## 2019-11-07 ENCOUNTER — OFFICE VISIT (OUTPATIENT)
Dept: NEUROLOGY | Facility: MEDICAL CENTER | Age: 79
End: 2019-11-07
Payer: MEDICARE

## 2019-11-07 VITALS
SYSTOLIC BLOOD PRESSURE: 126 MMHG | DIASTOLIC BLOOD PRESSURE: 78 MMHG | HEART RATE: 74 BPM | WEIGHT: 167 LBS | BODY MASS INDEX: 26.84 KG/M2 | HEIGHT: 66 IN | OXYGEN SATURATION: 97 %

## 2019-11-07 DIAGNOSIS — G20.A1 PARKINSON DISEASE: ICD-10-CM

## 2019-11-07 PROCEDURE — 99213 OFFICE O/P EST LOW 20 MIN: CPT | Performed by: PSYCHIATRY & NEUROLOGY

## 2019-11-07 RX ORDER — DORZOLAMIDE HYDROCHLORIDE AND TIMOLOL MALEATE 20; 5 MG/ML; MG/ML
1 SOLUTION/ DROPS OPHTHALMIC 2 TIMES DAILY
COMMUNITY
End: 2020-01-01 | Stop reason: CLARIF

## 2019-11-07 ASSESSMENT — ENCOUNTER SYMPTOMS
FALLS: 0
INSOMNIA: 1
TREMORS: 1
HALLUCINATIONS: 1
MEMORY LOSS: 1

## 2019-11-08 ENCOUNTER — APPOINTMENT (RX ONLY)
Dept: URBAN - METROPOLITAN AREA CLINIC 4 | Facility: CLINIC | Age: 79
Setting detail: DERMATOLOGY
End: 2019-11-08

## 2019-11-08 DIAGNOSIS — L81.4 OTHER MELANIN HYPERPIGMENTATION: ICD-10-CM

## 2019-11-08 DIAGNOSIS — D22 MELANOCYTIC NEVI: ICD-10-CM

## 2019-11-08 DIAGNOSIS — D18.0 HEMANGIOMA: ICD-10-CM

## 2019-11-08 DIAGNOSIS — L57.0 ACTINIC KERATOSIS: ICD-10-CM

## 2019-11-08 DIAGNOSIS — L82.1 OTHER SEBORRHEIC KERATOSIS: ICD-10-CM

## 2019-11-08 PROBLEM — D22.62 MELANOCYTIC NEVI OF LEFT UPPER LIMB, INCLUDING SHOULDER: Status: ACTIVE | Noted: 2019-11-08

## 2019-11-08 PROBLEM — D18.01 HEMANGIOMA OF SKIN AND SUBCUTANEOUS TISSUE: Status: ACTIVE | Noted: 2019-11-08

## 2019-11-08 PROBLEM — I10 ESSENTIAL (PRIMARY) HYPERTENSION: Status: ACTIVE | Noted: 2019-11-08

## 2019-11-08 PROBLEM — D22.5 MELANOCYTIC NEVI OF TRUNK: Status: ACTIVE | Noted: 2019-11-08

## 2019-11-08 PROBLEM — E78.5 HYPERLIPIDEMIA, UNSPECIFIED: Status: ACTIVE | Noted: 2019-11-08

## 2019-11-08 PROBLEM — D22.61 MELANOCYTIC NEVI OF RIGHT UPPER LIMB, INCLUDING SHOULDER: Status: ACTIVE | Noted: 2019-11-08

## 2019-11-08 PROCEDURE — ? COUNSELING

## 2019-11-08 PROCEDURE — 17000 DESTRUCT PREMALG LESION: CPT

## 2019-11-08 PROCEDURE — ? LIQUID NITROGEN

## 2019-11-08 PROCEDURE — 17003 DESTRUCT PREMALG LES 2-14: CPT

## 2019-11-08 PROCEDURE — 99203 OFFICE O/P NEW LOW 30 MIN: CPT | Mod: 25

## 2019-11-08 ASSESSMENT — LOCATION SIMPLE DESCRIPTION DERM
LOCATION SIMPLE: RIGHT CHEEK
LOCATION SIMPLE: RIGHT UPPER ARM
LOCATION SIMPLE: ABDOMEN
LOCATION SIMPLE: RIGHT LOWER BACK
LOCATION SIMPLE: LOWER BACK
LOCATION SIMPLE: LEFT UPPER ARM
LOCATION SIMPLE: LEFT HAND
LOCATION SIMPLE: LEFT FOREHEAD
LOCATION SIMPLE: RIGHT HAND
LOCATION SIMPLE: LEFT UPPER BACK
LOCATION SIMPLE: GLABELLA
LOCATION SIMPLE: LEFT FOREARM
LOCATION SIMPLE: CHEST
LOCATION SIMPLE: LEFT CLAVICULAR SKIN
LOCATION SIMPLE: LEFT ELBOW
LOCATION SIMPLE: RIGHT FOREARM

## 2019-11-08 ASSESSMENT — LOCATION DETAILED DESCRIPTION DERM
LOCATION DETAILED: LEFT ANTERIOR DISTAL UPPER ARM
LOCATION DETAILED: RIGHT RADIAL DORSAL HAND
LOCATION DETAILED: RIGHT VENTRAL PROXIMAL FOREARM
LOCATION DETAILED: LEFT ANTECUBITAL SKIN
LOCATION DETAILED: LEFT MEDIAL INFERIOR CHEST
LOCATION DETAILED: GLABELLA
LOCATION DETAILED: SUPERIOR LUMBAR SPINE
LOCATION DETAILED: RIGHT CENTRAL MALAR CHEEK
LOCATION DETAILED: RIGHT SUPERIOR MEDIAL MIDBACK
LOCATION DETAILED: RIGHT ANTERIOR DISTAL UPPER ARM
LOCATION DETAILED: LEFT INFERIOR UPPER BACK
LOCATION DETAILED: LEFT VENTRAL DISTAL FOREARM
LOCATION DETAILED: LEFT SUPERIOR LATERAL FOREHEAD
LOCATION DETAILED: LEFT VENTRAL PROXIMAL FOREARM
LOCATION DETAILED: LEFT RADIAL DORSAL HAND
LOCATION DETAILED: LEFT CLAVICULAR SKIN
LOCATION DETAILED: EPIGASTRIC SKIN

## 2019-11-08 ASSESSMENT — LOCATION ZONE DERM
LOCATION ZONE: ARM
LOCATION ZONE: HAND
LOCATION ZONE: FACE
LOCATION ZONE: TRUNK

## 2019-11-08 NOTE — PROGRESS NOTES
"Subjective:      Eric Packer is a 79 y.o. male who presents with his wife Cecilia, on an urgent basis, for follow-up with a history of Parkinson's disease with mild cognitive impairment, but who has had over the last month or more noticeable progression of hallucinations.    HPI    When last seen, we had maintained his regimen of Sinemet 25/100, 2 times daily and Requip ER 2 mg, 3 times daily.  In the interim he has begun to have real problems with hallucinations, he is seeing bugs and \"critters\" all over the house and into the bed, on one occasion he actually felt he trapped a bird in a pillow and Cecilia found him out in the house with the pillow wrapped around this creature.  When confronted, he simply acknowledged that the bird had gotten away.    He has become quite fixated with these hallucinations, it is beginning to become more than just a little distracting.  Sleep hygiene has been disturbed as a result.  His motor symptoms of the disease have remained unchanged.  He has not been wandering, etc.    Originally just on Sinemet monotherapy, we had added Requip in the hopes of improving motor function, but he has always been susceptible to hallucinations on dopamine.    Medical, surgical and family histories were reviewed in the electronic health record, there are no new drug allergies.  He is on Requip ER 2 mg, 3 times daily, Sinemet 25/100, twice daily, Cozaar, Nexium, Lipitor, and his eyedrops.    Review of Systems   Musculoskeletal: Negative for falls.   Neurological: Positive for tremors.   Psychiatric/Behavioral: Positive for hallucinations and memory loss. The patient has insomnia.    All other systems reviewed and are negative.       Objective:     /78 (BP Location: Left arm, Patient Position: Sitting, BP Cuff Size: Adult)   Pulse 74   Ht 1.676 m (5' 6\")   Wt 75.8 kg (167 lb)   SpO2 97%   BMI 26.95 kg/m²      Physical Exam    He appears in no acute distress.  His vital signs are " stable.  There is no malar rash.  The neck is supple.    He is fully oriented, though he does not state the name of the month, he does correctly choose when given options.  He knows his date of birth, identifies Cecilia for who she is.  He knows his diagnosis and who I am.  He is not actively hallucinating.    Bradykinesia remains unchanged, PERRLA/EOMI, there is mild hypophonia.  There is no tremor, rigidity is unchanged from that of a month ago.  He stands slowly but can do so independently, he walks with a cane.  There is still notable postural instability.  Repetitive movements show diminished amplitude, there is no appendicular dystaxia.     Assessment/Plan:     1. Parkinson disease (HCC)  Unfortunately, it seems that even a low-dose dopamine agonist is not going to be tolerated well, we will need to get him off the Requip and back on Sinemet, and he will likely require monotherapy with this drug, adding amantadine, or Azilect, an MAO-b1 inhibitor, etc.  The rationale for this was reviewed.  I doubt that he will be able to tolerate a trial of Neupro patch or Mirapex moving forwards.    Sinemet 25/100 will be continued unchanged, taken twice daily.  Requip ER 2 mg twice daily will be started for 2 weeks, then once a day for 2 weeks, and then off the drug entirely.  If his motor symptoms do worsen, and I was quite complete describing what this might appear to be, as well as worsening energy levels, increasing daytime sleepiness, fatigue, etc. then we will simply add Sinemet as we go lower on Requip.  He was reassured that the hallucinations will disappear.  We will follow-up otherwise in about 5 months.    Time: 20-minute spent face-to-face for exam, review, discussion, and education, of this over 50% of the time spent counseling and coordinating care.

## 2019-11-08 NOTE — HPI: UPPER BODY SKIN CHECK
How Severe Are Your Spot(S)?: mild
What Is The Reason For Today's Visit?: Upper Body Skin Exam
Additional History: New patient. UBE.

## 2019-12-06 ENCOUNTER — OFFICE VISIT (OUTPATIENT)
Dept: MEDICAL GROUP | Facility: MEDICAL CENTER | Age: 79
End: 2019-12-06
Payer: MEDICARE

## 2019-12-06 VITALS
OXYGEN SATURATION: 100 % | WEIGHT: 161 LBS | HEART RATE: 70 BPM | HEIGHT: 66 IN | RESPIRATION RATE: 16 BRPM | BODY MASS INDEX: 25.88 KG/M2 | DIASTOLIC BLOOD PRESSURE: 72 MMHG | SYSTOLIC BLOOD PRESSURE: 134 MMHG | TEMPERATURE: 97.8 F

## 2019-12-06 DIAGNOSIS — E78.2 MIXED HYPERLIPIDEMIA: ICD-10-CM

## 2019-12-06 DIAGNOSIS — I10 ESSENTIAL HYPERTENSION: ICD-10-CM

## 2019-12-06 DIAGNOSIS — G20.A1 PARKINSON DISEASE: ICD-10-CM

## 2019-12-06 PROCEDURE — 99214 OFFICE O/P EST MOD 30 MIN: CPT | Performed by: FAMILY MEDICINE

## 2019-12-06 RX ORDER — PNEUMOCOCCAL VACCINE POLYVALENT 25; 25; 25; 25; 25; 25; 25; 25; 25; 25; 25; 25; 25; 25; 25; 25; 25; 25; 25; 25; 25; 25; 25 UG/.5ML; UG/.5ML; UG/.5ML; UG/.5ML; UG/.5ML; UG/.5ML; UG/.5ML; UG/.5ML; UG/.5ML; UG/.5ML; UG/.5ML; UG/.5ML; UG/.5ML; UG/.5ML; UG/.5ML; UG/.5ML; UG/.5ML; UG/.5ML; UG/.5ML; UG/.5ML; UG/.5ML; UG/.5ML; UG/.5ML
INJECTION, SOLUTION INTRAMUSCULAR; SUBCUTANEOUS
Refills: 0 | COMMUNITY
Start: 2019-10-01 | End: 2020-01-01

## 2019-12-06 RX ORDER — INFLUENZA A VIRUS A/MICHIGAN/45/2015 X-275 (H1N1) ANTIGEN (FORMALDEHYDE INACTIVATED), INFLUENZA A VIRUS A/SINGAPORE/INFIMH-16-0019/2016 IVR-186 (H3N2) ANTIGEN (FORMALDEHYDE INACTIVATED), AND INFLUENZA B VIRUS B/MARYLAND/15/2016 BX-69A (A B/COLORADO/6/2017-LIKE VIRUS) ANTIGEN (FORMALDEHYDE INACTIVATED) 60; 60; 60 UG/.5ML; UG/.5ML; UG/.5ML
INJECTION, SUSPENSION INTRAMUSCULAR
Refills: 0 | COMMUNITY
Start: 2019-10-01 | End: 2020-01-01

## 2019-12-06 NOTE — PROGRESS NOTES
CC: Hypertension, Parkinson disease, hyperlipidemia    HPI:   Michael presents today to discuss the following    Essential hypertension  Has been adequately controlled on current medication. Denies headache, chest pain, and SOB.patient has been on losartan 50 mg daily.  No side effects    Parkinson disease (HCC)  Patient developed side effects from ropinirole(hallucinations), patient was tapered off the medication.  Sinemet 25- 100 mg was increased to 3 times a day.  However patient still complaining of hallucinations in fact it has worsened, he has been seeing bugs and chickens especially at night.  His physical symptoms(tremors and bradykinesia) has worsened as well since he started Ropinirole.    Mixed hyperlipidemia  He has been tolerating the statin. Denies muscle pain LFTs has been normal, has been on atorvastatin 20 g daily.    Patient Active Problem List    Diagnosis Date Noted   • Skin lesion 08/01/2019   • Parkinson disease (HCC) 08/13/2018   • Essential hypertension 08/13/2018   • Mixed hyperlipidemia 08/13/2018   • GENIA (obstructive sleep apnea) 08/13/2018   • Dementia associated with other underlying disease without behavioral disturbance ([facility]) 08/13/2018   • Incontinence of feces 08/13/2018       Current Outpatient Medications   Medication Sig Dispense Refill   • dorzolamide-timolol (COSOPT) 22.3-6.8 MG/ML Solution      • esomeprazole (NEXIUM) 40 MG delayed-release capsule TAKE 1 CAPSULE DAILY 90 Cap 3   • Ropinirole HCl 2 MG TABLET SR 24 HR Take 3 Tabs by mouth every day. (Patient not taking: Reported on 12/6/2019) 270 Tab 3   • carbidopa-levodopa (SINEMET)  MG Tab Take 1 Tab by mouth 2 times a day. Take at 7AM and 4PM 180 Tab 3   • atorvastatin (LIPITOR) 20 MG Tab Take 1 Tab by mouth every day. 90 Tab 3   • losartan (COZAAR) 50 MG Tab Take 1 Tab by mouth every day. 90 Tab 3   • latanoprost (XALATAN) 0.005 % Solution      • Cholecalciferol 4000 units Cap Take  by mouth.       No current  "facility-administered medications for this visit.          Allergies as of 12/06/2019   • (No Known Allergies)        ROS: Denies any chest pain, Shortness of breath, Changes bowel or bladder, Lower extremity edema.    Physical Exam:  /72 (BP Location: Right arm, Patient Position: Sitting, BP Cuff Size: Adult)   Pulse 70   Temp 36.6 °C (97.8 °F) (Temporal)   Resp 16   Ht 1.676 m (5' 6\")   Wt 73 kg (161 lb)   SpO2 100%   BMI 25.99 kg/m²   Gen.: Well-developed, well-nourished, no apparent distress,pleasant and cooperative with the examination  Skin:  Warm and dry with good turgor. No rashes or suspicious lesions in visible areas  HEENT:Sinuses nontender with palpation, TMs clear, nares patent with pink mucosa and clear rhinorrhea,no septal deviation ,polyps or lesions. lips without lesions, oropharynx clear.  Neck: Trachea midline,no masses or adenopathy. No JVD.  Cor: Regular rate and rhythm without murmur, gallop or rub.  Lungs: Respirations unlabored.Clear to auscultation with equal breath sounds bilaterally. No wheezes, rhonchi.  Extremities: No cyanosis, clubbing or edema.          Assessment and Plan.   79 y.o. male     1. Essential hypertension  Controlled.  Continue on losartan 50 mg daily.    - CBC WITH DIFFERENTIAL; Future  - Comp Metabolic Panel; Future  - Lipid Profile; Future  - TSH; Future    2. Parkinson disease (HCC)  Developed side effects from ropinirole(hallucinations), so medication was tapered off and eventually he stopped it.  Sinemet 25- 100 mg was increased to 3 times a day.  However patient still complaining of hallucinations(has been seeing bugs and chickens especially at night).  Patient advised to see Dr. Welsh to discuss the issue, probably patient need to decrease the dose of Sinemet and add amantadine.  However I will leave that to the neurologist to decide.    3. Mixed hyperlipidemia  He has been tolerating the statin. Denies muscle pain LFTs has been normal  Continue on " atorvastatin 20 g daily.    - Lipid Profile; Future  - TSH; Future

## 2019-12-09 ENCOUNTER — TELEPHONE (OUTPATIENT)
Dept: NEUROLOGY | Facility: MEDICAL CENTER | Age: 79
End: 2019-12-09

## 2019-12-09 DIAGNOSIS — F02.80 DEMENTIA ASSOCIATED WITH OTHER UNDERLYING DISEASE WITHOUT BEHAVIORAL DISTURBANCE (HCC): ICD-10-CM

## 2019-12-09 NOTE — TELEPHONE ENCOUNTER
Dr. Brown,    Patient's wife came by the office and wanted to ask you if you had direction on what to do for Eric. She says that he has been hallucinating and seeing and hearing things.       Please adviseAbi

## 2019-12-10 RX ORDER — QUETIAPINE FUMARATE 25 MG/1
TABLET, FILM COATED ORAL
Qty: 60 TAB | Refills: 1 | Status: SHIPPED | OUTPATIENT
Start: 2019-12-10 | End: 2020-01-01

## 2019-12-10 NOTE — TELEPHONE ENCOUNTER
----- Message from Abi Lanier, Med Ass't sent at 12/6/2019 10:52 AM PST -----  Regarding: patient concerns  Patient's wife came by the office and wanted to ask you if you had direction on what to do for Eric. She says that he has been hallucinating and seeing and hearing things.       Please adviseAbi

## 2019-12-10 NOTE — TELEPHONE ENCOUNTER
He had been hallucinating when I had last seen him 1 month ago, we are trying to get him off Requip, the drug most likely creating a problem in this regard.  Find out what dose of the drug he is on, we are doing a slow titration to avoid rebound phenomena.  Other hallucinations about the same, of a better, worse, etc.?  Look at my November, 7, 2019 office note to give you an idea of what we are trying to do.

## 2019-12-10 NOTE — TELEPHONE ENCOUNTER
Hopefully, Cecilia can still have him take medication, I will start Seroquel 25 mg every evening for 2 days, increasing to 50 mg every evening.  The drug should make him sleepy and hopefully calm her more calm.  He is still on Sinemet, even though a very low dose, this too must be stopped since it too can create problems with hallucinations.  If the behavior persists, she will likely need to get him into an emergency room for admission and evaluation.  If this were related to Requip, as I had suspected, his hallucination should have improved, and it seems that subacutely they have gotten much worse.  There may be something else that is superimposed on top of his chronic issues of Parkinson's disease with dementia.  These 2 conditions do not change in such a relatively short order, on a sustained basis, and to this degree.

## 2019-12-11 NOTE — TELEPHONE ENCOUNTER
Called patients Wife (Cecilia) she stated understanding and if the hallucinations continue they will bring him to the ER.  She stated understand on how he is to take the Seroquel and I made her write the instructions down and read them back to me.  She was great full for call back.

## 2019-12-17 ENCOUNTER — HOSPITAL ENCOUNTER (OUTPATIENT)
Dept: LAB | Facility: MEDICAL CENTER | Age: 79
End: 2019-12-17
Attending: FAMILY MEDICINE
Payer: MEDICARE

## 2019-12-17 DIAGNOSIS — I10 ESSENTIAL HYPERTENSION: ICD-10-CM

## 2019-12-17 DIAGNOSIS — E78.2 MIXED HYPERLIPIDEMIA: ICD-10-CM

## 2019-12-17 LAB
ALBUMIN SERPL BCP-MCNC: 4.1 G/DL (ref 3.2–4.9)
ALBUMIN/GLOB SERPL: 1.7 G/DL
ALP SERPL-CCNC: 69 U/L (ref 30–99)
ALT SERPL-CCNC: <5 U/L (ref 2–50)
ANION GAP SERPL CALC-SCNC: 6 MMOL/L (ref 0–11.9)
AST SERPL-CCNC: 17 U/L (ref 12–45)
BASOPHILS # BLD AUTO: 0.5 % (ref 0–1.8)
BASOPHILS # BLD: 0.05 K/UL (ref 0–0.12)
BILIRUB SERPL-MCNC: 0.7 MG/DL (ref 0.1–1.5)
BUN SERPL-MCNC: 18 MG/DL (ref 8–22)
CALCIUM SERPL-MCNC: 9.1 MG/DL (ref 8.5–10.5)
CHLORIDE SERPL-SCNC: 105 MMOL/L (ref 96–112)
CHOLEST SERPL-MCNC: 150 MG/DL (ref 100–199)
CO2 SERPL-SCNC: 26 MMOL/L (ref 20–33)
CREAT SERPL-MCNC: 1.03 MG/DL (ref 0.5–1.4)
EOSINOPHIL # BLD AUTO: 0.31 K/UL (ref 0–0.51)
EOSINOPHIL NFR BLD: 3.4 % (ref 0–6.9)
ERYTHROCYTE [DISTWIDTH] IN BLOOD BY AUTOMATED COUNT: 45.9 FL (ref 35.9–50)
GLOBULIN SER CALC-MCNC: 2.4 G/DL (ref 1.9–3.5)
GLUCOSE SERPL-MCNC: 110 MG/DL (ref 65–99)
HCT VFR BLD AUTO: 49.8 % (ref 42–52)
HDLC SERPL-MCNC: 43 MG/DL
HGB BLD-MCNC: 16.6 G/DL (ref 14–18)
IMM GRANULOCYTES # BLD AUTO: 0.12 K/UL (ref 0–0.11)
IMM GRANULOCYTES NFR BLD AUTO: 1.3 % (ref 0–0.9)
LDLC SERPL CALC-MCNC: 81 MG/DL
LYMPHOCYTES # BLD AUTO: 1.83 K/UL (ref 1–4.8)
LYMPHOCYTES NFR BLD: 20 % (ref 22–41)
MCH RBC QN AUTO: 30.9 PG (ref 27–33)
MCHC RBC AUTO-ENTMCNC: 33.3 G/DL (ref 33.7–35.3)
MCV RBC AUTO: 92.7 FL (ref 81.4–97.8)
MONOCYTES # BLD AUTO: 0.95 K/UL (ref 0–0.85)
MONOCYTES NFR BLD AUTO: 10.4 % (ref 0–13.4)
NEUTROPHILS # BLD AUTO: 5.89 K/UL (ref 1.82–7.42)
NEUTROPHILS NFR BLD: 64.4 % (ref 44–72)
NRBC # BLD AUTO: 0 K/UL
NRBC BLD-RTO: 0 /100 WBC
PLATELET # BLD AUTO: 195 K/UL (ref 164–446)
PMV BLD AUTO: 10.3 FL (ref 9–12.9)
POTASSIUM SERPL-SCNC: 4.2 MMOL/L (ref 3.6–5.5)
PROT SERPL-MCNC: 6.5 G/DL (ref 6–8.2)
RBC # BLD AUTO: 5.37 M/UL (ref 4.7–6.1)
SODIUM SERPL-SCNC: 137 MMOL/L (ref 135–145)
TRIGL SERPL-MCNC: 131 MG/DL (ref 0–149)
TSH SERPL DL<=0.005 MIU/L-ACNC: 4.13 UIU/ML (ref 0.38–5.33)
WBC # BLD AUTO: 9.2 K/UL (ref 4.8–10.8)

## 2019-12-17 PROCEDURE — 36415 COLL VENOUS BLD VENIPUNCTURE: CPT

## 2019-12-17 PROCEDURE — 85025 COMPLETE CBC W/AUTO DIFF WBC: CPT

## 2019-12-17 PROCEDURE — 80061 LIPID PANEL: CPT

## 2019-12-17 PROCEDURE — 84443 ASSAY THYROID STIM HORMONE: CPT

## 2019-12-17 PROCEDURE — 80053 COMPREHEN METABOLIC PANEL: CPT

## 2019-12-17 RX ORDER — LOSARTAN POTASSIUM 50 MG/1
50 TABLET ORAL DAILY
Qty: 90 TAB | Refills: 3 | Status: SHIPPED | OUTPATIENT
Start: 2019-12-17 | End: 2020-01-01

## 2019-12-23 RX ORDER — ATORVASTATIN CALCIUM 20 MG/1
20 TABLET, FILM COATED ORAL DAILY
Qty: 90 TAB | Refills: 3 | Status: SHIPPED | OUTPATIENT
Start: 2019-12-23 | End: 2020-01-01

## 2020-01-01 ENCOUNTER — HOME CARE VISIT (OUTPATIENT)
Dept: HOME HEALTH SERVICES | Facility: HOME HEALTHCARE | Age: 80
End: 2020-01-01
Payer: MEDICARE

## 2020-01-01 ENCOUNTER — HOME CARE VISIT (OUTPATIENT)
Dept: HOSPICE | Facility: HOSPICE | Age: 80
End: 2020-01-01
Payer: MEDICARE

## 2020-01-01 ENCOUNTER — APPOINTMENT (OUTPATIENT)
Dept: NEUROLOGY | Facility: MEDICAL CENTER | Age: 80
End: 2020-01-01
Payer: MEDICARE

## 2020-01-01 ENCOUNTER — ANTICOAGULATION MONITORING (OUTPATIENT)
Dept: MEDICAL GROUP | Facility: PHYSICIAN GROUP | Age: 80
End: 2020-01-01

## 2020-01-01 ENCOUNTER — APPOINTMENT (OUTPATIENT)
Dept: RADIOLOGY | Facility: MEDICAL CENTER | Age: 80
End: 2020-01-01
Attending: EMERGENCY MEDICINE
Payer: MEDICARE

## 2020-01-01 ENCOUNTER — TELEPHONE (OUTPATIENT)
Dept: NEUROLOGY | Facility: MEDICAL CENTER | Age: 80
End: 2020-01-01

## 2020-01-01 ENCOUNTER — HOSPITAL ENCOUNTER (OUTPATIENT)
Facility: MEDICAL CENTER | Age: 80
End: 2020-01-19
Attending: EMERGENCY MEDICINE | Admitting: HOSPITALIST
Payer: MEDICARE

## 2020-01-01 ENCOUNTER — TELEPHONE (OUTPATIENT)
Dept: MEDICAL GROUP | Facility: MEDICAL CENTER | Age: 80
End: 2020-01-01

## 2020-01-01 ENCOUNTER — HOSPICE ADMISSION (OUTPATIENT)
Dept: HOSPICE | Facility: HOSPICE | Age: 80
End: 2020-01-01
Payer: MEDICARE

## 2020-01-01 ENCOUNTER — HOME HEALTH ADMISSION (OUTPATIENT)
Dept: HOME HEALTH SERVICES | Facility: HOME HEALTHCARE | Age: 80
End: 2020-01-01
Payer: MEDICARE

## 2020-01-01 ENCOUNTER — OFFICE VISIT (OUTPATIENT)
Dept: MEDICAL GROUP | Facility: MEDICAL CENTER | Age: 80
End: 2020-01-01
Payer: MEDICARE

## 2020-01-01 ENCOUNTER — OFFICE VISIT (OUTPATIENT)
Dept: NEUROLOGY | Facility: MEDICAL CENTER | Age: 80
End: 2020-01-01
Payer: MEDICARE

## 2020-01-01 ENCOUNTER — HOSPITAL ENCOUNTER (OUTPATIENT)
Dept: RADIOLOGY | Facility: MEDICAL CENTER | Age: 80
End: 2020-02-05
Attending: FAMILY MEDICINE
Payer: MEDICARE

## 2020-01-01 ENCOUNTER — NON-PROVIDER VISIT (OUTPATIENT)
Dept: CARDIOLOGY | Facility: MEDICAL CENTER | Age: 80
End: 2020-01-01
Payer: MEDICARE

## 2020-01-01 ENCOUNTER — APPOINTMENT (OUTPATIENT)
Dept: CARDIOLOGY | Facility: MEDICAL CENTER | Age: 80
End: 2020-01-01
Attending: HOSPITALIST
Payer: MEDICARE

## 2020-01-01 ENCOUNTER — PATIENT OUTREACH (OUTPATIENT)
Dept: MEDICAL GROUP | Facility: MEDICAL CENTER | Age: 80
End: 2020-01-01

## 2020-01-01 VITALS
HEART RATE: 76 BPM | HEIGHT: 66 IN | SYSTOLIC BLOOD PRESSURE: 118 MMHG | BODY MASS INDEX: 24.27 KG/M2 | DIASTOLIC BLOOD PRESSURE: 74 MMHG | WEIGHT: 151 LBS | OXYGEN SATURATION: 95 % | TEMPERATURE: 99.1 F | RESPIRATION RATE: 16 BRPM

## 2020-01-01 VITALS
HEART RATE: 84 BPM | OXYGEN SATURATION: 95 % | DIASTOLIC BLOOD PRESSURE: 63 MMHG | SYSTOLIC BLOOD PRESSURE: 92 MMHG | TEMPERATURE: 98.1 F | RESPIRATION RATE: 16 BRPM

## 2020-01-01 VITALS
HEART RATE: 75 BPM | DIASTOLIC BLOOD PRESSURE: 56 MMHG | OXYGEN SATURATION: 97 % | TEMPERATURE: 97.8 F | RESPIRATION RATE: 16 BRPM | SYSTOLIC BLOOD PRESSURE: 86 MMHG

## 2020-01-01 VITALS — DIASTOLIC BLOOD PRESSURE: 70 MMHG | HEART RATE: 78 BPM | SYSTOLIC BLOOD PRESSURE: 120 MMHG | RESPIRATION RATE: 16 BRPM

## 2020-01-01 VITALS
HEART RATE: 76 BPM | RESPIRATION RATE: 16 BRPM | DIASTOLIC BLOOD PRESSURE: 63 MMHG | SYSTOLIC BLOOD PRESSURE: 109 MMHG | OXYGEN SATURATION: 98 % | TEMPERATURE: 98 F

## 2020-01-01 VITALS
SYSTOLIC BLOOD PRESSURE: 100 MMHG | RESPIRATION RATE: 16 BRPM | SYSTOLIC BLOOD PRESSURE: 100 MMHG | OXYGEN SATURATION: 98 % | DIASTOLIC BLOOD PRESSURE: 60 MMHG | DIASTOLIC BLOOD PRESSURE: 60 MMHG | HEART RATE: 78 BPM | HEART RATE: 77 BPM | TEMPERATURE: 98 F | RESPIRATION RATE: 20 BRPM

## 2020-01-01 VITALS — HEART RATE: 76 BPM | RESPIRATION RATE: 18 BRPM | DIASTOLIC BLOOD PRESSURE: 80 MMHG | SYSTOLIC BLOOD PRESSURE: 120 MMHG

## 2020-01-01 VITALS
TEMPERATURE: 98.7 F | OXYGEN SATURATION: 94 % | DIASTOLIC BLOOD PRESSURE: 70 MMHG | RESPIRATION RATE: 16 BRPM | SYSTOLIC BLOOD PRESSURE: 129 MMHG | HEART RATE: 84 BPM

## 2020-01-01 VITALS — DIASTOLIC BLOOD PRESSURE: 50 MMHG | RESPIRATION RATE: 16 BRPM | SYSTOLIC BLOOD PRESSURE: 90 MMHG | HEART RATE: 80 BPM

## 2020-01-01 VITALS
HEART RATE: 98 BPM | HEIGHT: 66 IN | BODY MASS INDEX: 25.76 KG/M2 | DIASTOLIC BLOOD PRESSURE: 82 MMHG | TEMPERATURE: 98.7 F | RESPIRATION RATE: 16 BRPM | WEIGHT: 160.27 LBS | SYSTOLIC BLOOD PRESSURE: 138 MMHG | OXYGEN SATURATION: 93 %

## 2020-01-01 VITALS
RESPIRATION RATE: 16 BRPM | HEART RATE: 75 BPM | OXYGEN SATURATION: 98 % | DIASTOLIC BLOOD PRESSURE: 50 MMHG | TEMPERATURE: 97.8 F | SYSTOLIC BLOOD PRESSURE: 80 MMHG

## 2020-01-01 VITALS
HEART RATE: 73 BPM | TEMPERATURE: 97.7 F | OXYGEN SATURATION: 96 % | SYSTOLIC BLOOD PRESSURE: 102 MMHG | DIASTOLIC BLOOD PRESSURE: 56 MMHG | RESPIRATION RATE: 16 BRPM

## 2020-01-01 VITALS
SYSTOLIC BLOOD PRESSURE: 116 MMHG | DIASTOLIC BLOOD PRESSURE: 70 MMHG | TEMPERATURE: 97 F | HEART RATE: 80 BPM | RESPIRATION RATE: 16 BRPM

## 2020-01-01 VITALS
DIASTOLIC BLOOD PRESSURE: 72 MMHG | HEART RATE: 70 BPM | SYSTOLIC BLOOD PRESSURE: 130 MMHG | RESPIRATION RATE: 16 BRPM | OXYGEN SATURATION: 94 % | TEMPERATURE: 98.4 F

## 2020-01-01 VITALS
DIASTOLIC BLOOD PRESSURE: 66 MMHG | TEMPERATURE: 97.4 F | DIASTOLIC BLOOD PRESSURE: 68 MMHG | SYSTOLIC BLOOD PRESSURE: 110 MMHG | RESPIRATION RATE: 16 BRPM | RESPIRATION RATE: 14 BRPM | HEART RATE: 60 BPM | OXYGEN SATURATION: 99 % | HEART RATE: 77 BPM | SYSTOLIC BLOOD PRESSURE: 112 MMHG

## 2020-01-01 VITALS — RESPIRATION RATE: 12 BRPM | HEART RATE: 58 BPM

## 2020-01-01 VITALS — DIASTOLIC BLOOD PRESSURE: 60 MMHG | HEART RATE: 80 BPM | SYSTOLIC BLOOD PRESSURE: 100 MMHG | RESPIRATION RATE: 18 BRPM

## 2020-01-01 VITALS — SYSTOLIC BLOOD PRESSURE: 120 MMHG | DIASTOLIC BLOOD PRESSURE: 60 MMHG | HEART RATE: 76 BPM | RESPIRATION RATE: 18 BRPM

## 2020-01-01 VITALS
TEMPERATURE: 97.7 F | OXYGEN SATURATION: 99 % | HEART RATE: 74 BPM | RESPIRATION RATE: 16 BRPM | DIASTOLIC BLOOD PRESSURE: 60 MMHG | SYSTOLIC BLOOD PRESSURE: 96 MMHG

## 2020-01-01 VITALS
SYSTOLIC BLOOD PRESSURE: 100 MMHG | HEART RATE: 77 BPM | OXYGEN SATURATION: 100 % | TEMPERATURE: 97.6 F | TEMPERATURE: 97.9 F | OXYGEN SATURATION: 98 % | DIASTOLIC BLOOD PRESSURE: 66 MMHG | HEART RATE: 74 BPM | RESPIRATION RATE: 16 BRPM | RESPIRATION RATE: 16 BRPM | DIASTOLIC BLOOD PRESSURE: 64 MMHG | SYSTOLIC BLOOD PRESSURE: 104 MMHG

## 2020-01-01 VITALS — RESPIRATION RATE: 16 BRPM | SYSTOLIC BLOOD PRESSURE: 102 MMHG | DIASTOLIC BLOOD PRESSURE: 62 MMHG

## 2020-01-01 VITALS
BODY MASS INDEX: 25.88 KG/M2 | RESPIRATION RATE: 16 BRPM | SYSTOLIC BLOOD PRESSURE: 122 MMHG | TEMPERATURE: 97.4 F | HEART RATE: 69 BPM | OXYGEN SATURATION: 95 % | DIASTOLIC BLOOD PRESSURE: 70 MMHG | HEIGHT: 66 IN | WEIGHT: 161 LBS

## 2020-01-01 VITALS
HEART RATE: 72 BPM | SYSTOLIC BLOOD PRESSURE: 110 MMHG | OXYGEN SATURATION: 99 % | DIASTOLIC BLOOD PRESSURE: 80 MMHG | RESPIRATION RATE: 16 BRPM | TEMPERATURE: 98.2 F

## 2020-01-01 VITALS
TEMPERATURE: 97.5 F | DIASTOLIC BLOOD PRESSURE: 80 MMHG | HEART RATE: 72 BPM | RESPIRATION RATE: 16 BRPM | SYSTOLIC BLOOD PRESSURE: 110 MMHG | OXYGEN SATURATION: 97 %

## 2020-01-01 VITALS
HEART RATE: 71 BPM | DIASTOLIC BLOOD PRESSURE: 68 MMHG | OXYGEN SATURATION: 97 % | TEMPERATURE: 98.3 F | SYSTOLIC BLOOD PRESSURE: 124 MMHG | RESPIRATION RATE: 16 BRPM

## 2020-01-01 VITALS — SYSTOLIC BLOOD PRESSURE: 80 MMHG | RESPIRATION RATE: 16 BRPM | DIASTOLIC BLOOD PRESSURE: 50 MMHG | HEART RATE: 76 BPM

## 2020-01-01 VITALS
OXYGEN SATURATION: 94 % | TEMPERATURE: 98.4 F | HEART RATE: 76 BPM | RESPIRATION RATE: 16 BRPM | SYSTOLIC BLOOD PRESSURE: 118 MMHG | DIASTOLIC BLOOD PRESSURE: 72 MMHG

## 2020-01-01 VITALS
RESPIRATION RATE: 16 BRPM | TEMPERATURE: 97.6 F | OXYGEN SATURATION: 98 % | SYSTOLIC BLOOD PRESSURE: 90 MMHG | HEART RATE: 77 BPM | DIASTOLIC BLOOD PRESSURE: 58 MMHG

## 2020-01-01 VITALS — RESPIRATION RATE: 18 BRPM | DIASTOLIC BLOOD PRESSURE: 60 MMHG | SYSTOLIC BLOOD PRESSURE: 100 MMHG | HEART RATE: 100 BPM

## 2020-01-01 VITALS — HEART RATE: 100 BPM | RESPIRATION RATE: 20 BRPM

## 2020-01-01 VITALS
SYSTOLIC BLOOD PRESSURE: 110 MMHG | HEART RATE: 82 BPM | TEMPERATURE: 98.2 F | DIASTOLIC BLOOD PRESSURE: 72 MMHG | RESPIRATION RATE: 16 BRPM | OXYGEN SATURATION: 97 %

## 2020-01-01 VITALS
TEMPERATURE: 98.7 F | OXYGEN SATURATION: 94 % | SYSTOLIC BLOOD PRESSURE: 129 MMHG | RESPIRATION RATE: 16 BRPM | DIASTOLIC BLOOD PRESSURE: 70 MMHG | HEART RATE: 84 BPM

## 2020-01-01 VITALS
RESPIRATION RATE: 16 BRPM | TEMPERATURE: 98.7 F | OXYGEN SATURATION: 94 % | SYSTOLIC BLOOD PRESSURE: 132 MMHG | HEART RATE: 84 BPM | DIASTOLIC BLOOD PRESSURE: 68 MMHG

## 2020-01-01 VITALS — RESPIRATION RATE: 24 BRPM | DIASTOLIC BLOOD PRESSURE: 50 MMHG | HEART RATE: 100 BPM | SYSTOLIC BLOOD PRESSURE: 70 MMHG

## 2020-01-01 VITALS
DIASTOLIC BLOOD PRESSURE: 62 MMHG | RESPIRATION RATE: 16 BRPM | HEART RATE: 78 BPM | OXYGEN SATURATION: 97 % | SYSTOLIC BLOOD PRESSURE: 102 MMHG | HEIGHT: 66 IN | TEMPERATURE: 97.4 F | BODY MASS INDEX: 24.37 KG/M2

## 2020-01-01 VITALS
TEMPERATURE: 98.4 F | OXYGEN SATURATION: 94 % | RESPIRATION RATE: 16 BRPM | HEART RATE: 76 BPM | DIASTOLIC BLOOD PRESSURE: 72 MMHG | SYSTOLIC BLOOD PRESSURE: 118 MMHG

## 2020-01-01 VITALS — SYSTOLIC BLOOD PRESSURE: 100 MMHG | DIASTOLIC BLOOD PRESSURE: 60 MMHG | RESPIRATION RATE: 16 BRPM | HEART RATE: 78 BPM

## 2020-01-01 VITALS — RESPIRATION RATE: 24 BRPM | HEART RATE: 96 BPM

## 2020-01-01 VITALS — HEART RATE: 76 BPM | SYSTOLIC BLOOD PRESSURE: 118 MMHG | DIASTOLIC BLOOD PRESSURE: 72 MMHG

## 2020-01-01 VITALS
TEMPERATURE: 99.2 F | HEART RATE: 76 BPM | RESPIRATION RATE: 17 BRPM | DIASTOLIC BLOOD PRESSURE: 62 MMHG | OXYGEN SATURATION: 99 % | SYSTOLIC BLOOD PRESSURE: 112 MMHG

## 2020-01-01 VITALS
SYSTOLIC BLOOD PRESSURE: 153 MMHG | TEMPERATURE: 98.6 F | BODY MASS INDEX: 30.49 KG/M2 | OXYGEN SATURATION: 92 % | WEIGHT: 182.98 LBS | RESPIRATION RATE: 17 BRPM | HEART RATE: 85 BPM | HEIGHT: 65 IN | DIASTOLIC BLOOD PRESSURE: 91 MMHG

## 2020-01-01 VITALS — HEART RATE: 80 BPM | RESPIRATION RATE: 16 BRPM | SYSTOLIC BLOOD PRESSURE: 84 MMHG | DIASTOLIC BLOOD PRESSURE: 60 MMHG

## 2020-01-01 VITALS
TEMPERATURE: 97.6 F | OXYGEN SATURATION: 98 % | RESPIRATION RATE: 16 BRPM | DIASTOLIC BLOOD PRESSURE: 60 MMHG | SYSTOLIC BLOOD PRESSURE: 96 MMHG | HEART RATE: 79 BPM

## 2020-01-01 VITALS — SYSTOLIC BLOOD PRESSURE: 155 MMHG | DIASTOLIC BLOOD PRESSURE: 87 MMHG | HEART RATE: 78 BPM | RESPIRATION RATE: 20 BRPM

## 2020-01-01 VITALS
RESPIRATION RATE: 16 BRPM | TEMPERATURE: 97.7 F | DIASTOLIC BLOOD PRESSURE: 60 MMHG | HEART RATE: 70 BPM | OXYGEN SATURATION: 94 % | SYSTOLIC BLOOD PRESSURE: 102 MMHG

## 2020-01-01 VITALS — DIASTOLIC BLOOD PRESSURE: 60 MMHG | RESPIRATION RATE: 18 BRPM | SYSTOLIC BLOOD PRESSURE: 106 MMHG | HEART RATE: 76 BPM

## 2020-01-01 VITALS — RESPIRATION RATE: 18 BRPM | SYSTOLIC BLOOD PRESSURE: 130 MMHG | HEART RATE: 80 BPM | DIASTOLIC BLOOD PRESSURE: 80 MMHG

## 2020-01-01 VITALS
HEART RATE: 78 BPM | TEMPERATURE: 98.2 F | SYSTOLIC BLOOD PRESSURE: 90 MMHG | DIASTOLIC BLOOD PRESSURE: 60 MMHG | RESPIRATION RATE: 18 BRPM

## 2020-01-01 VITALS — SYSTOLIC BLOOD PRESSURE: 100 MMHG | RESPIRATION RATE: 16 BRPM | HEART RATE: 70 BPM | DIASTOLIC BLOOD PRESSURE: 60 MMHG

## 2020-01-01 VITALS
SYSTOLIC BLOOD PRESSURE: 110 MMHG | HEART RATE: 78 BPM | TEMPERATURE: 97.3 F | RESPIRATION RATE: 18 BRPM | DIASTOLIC BLOOD PRESSURE: 60 MMHG

## 2020-01-01 VITALS — SYSTOLIC BLOOD PRESSURE: 100 MMHG | DIASTOLIC BLOOD PRESSURE: 60 MMHG | HEART RATE: 76 BPM | RESPIRATION RATE: 18 BRPM

## 2020-01-01 VITALS — HEART RATE: 118 BPM | RESPIRATION RATE: 20 BRPM

## 2020-01-01 DIAGNOSIS — G20.A1 PARKINSON DISEASE (HCC): Primary | ICD-10-CM

## 2020-01-01 DIAGNOSIS — R55 SYNCOPE, UNSPECIFIED SYNCOPE TYPE: ICD-10-CM

## 2020-01-01 DIAGNOSIS — I10 ESSENTIAL HYPERTENSION: ICD-10-CM

## 2020-01-01 DIAGNOSIS — E78.2 MIXED HYPERLIPIDEMIA: ICD-10-CM

## 2020-01-01 DIAGNOSIS — F51.04 CHRONIC INSOMNIA: ICD-10-CM

## 2020-01-01 DIAGNOSIS — G20.A1 PARKINSON DISEASE (HCC): ICD-10-CM

## 2020-01-01 DIAGNOSIS — F02.80 DEMENTIA ASSOCIATED WITH OTHER UNDERLYING DISEASE WITHOUT BEHAVIORAL DISTURBANCE (HCC): ICD-10-CM

## 2020-01-01 DIAGNOSIS — Z09 HOSPITAL DISCHARGE FOLLOW-UP: ICD-10-CM

## 2020-01-01 DIAGNOSIS — I47.19 PAT (PAROXYSMAL ATRIAL TACHYCARDIA) (HCC): ICD-10-CM

## 2020-01-01 DIAGNOSIS — K21.9 GASTROESOPHAGEAL REFLUX DISEASE WITHOUT ESOPHAGITIS: ICD-10-CM

## 2020-01-01 DIAGNOSIS — Z87.898 H/O SYNCOPE: ICD-10-CM

## 2020-01-01 LAB
ALBUMIN SERPL BCP-MCNC: 4.1 G/DL (ref 3.2–4.9)
ALBUMIN/GLOB SERPL: 1.7 G/DL
ALP SERPL-CCNC: 72 U/L (ref 30–99)
ALT SERPL-CCNC: 5 U/L (ref 2–50)
ANION GAP SERPL CALC-SCNC: 10 MMOL/L (ref 0–11.9)
AST SERPL-CCNC: 21 U/L (ref 12–45)
BASOPHILS # BLD AUTO: 0.5 % (ref 0–1.8)
BASOPHILS # BLD: 0.04 K/UL (ref 0–0.12)
BILIRUB SERPL-MCNC: 0.5 MG/DL (ref 0.1–1.5)
BUN SERPL-MCNC: 17 MG/DL (ref 8–22)
CALCIUM SERPL-MCNC: 9 MG/DL (ref 8.5–10.5)
CHLORIDE SERPL-SCNC: 106 MMOL/L (ref 96–112)
CO2 SERPL-SCNC: 22 MMOL/L (ref 20–33)
CORTIS SERPL-MCNC: 5 UG/DL (ref 0–23)
CREAT SERPL-MCNC: 1.04 MG/DL (ref 0.5–1.4)
EKG IMPRESSION: NORMAL
EKG IMPRESSION: NORMAL
EOSINOPHIL # BLD AUTO: 0.25 K/UL (ref 0–0.51)
EOSINOPHIL NFR BLD: 3.1 % (ref 0–6.9)
ERYTHROCYTE [DISTWIDTH] IN BLOOD BY AUTOMATED COUNT: 47.6 FL (ref 35.9–50)
GLOBULIN SER CALC-MCNC: 2.4 G/DL (ref 1.9–3.5)
GLUCOSE SERPL-MCNC: 103 MG/DL (ref 65–99)
HCT VFR BLD AUTO: 47.3 % (ref 42–52)
HGB BLD-MCNC: 15.7 G/DL (ref 14–18)
IMM GRANULOCYTES # BLD AUTO: 0.07 K/UL (ref 0–0.11)
IMM GRANULOCYTES NFR BLD AUTO: 0.9 % (ref 0–0.9)
INR PPP: 1.07 (ref 0.87–1.13)
LV EJECT FRACT  99904: 55
LV EJECT FRACT MOD 2C 99903: 35.26
LV EJECT FRACT MOD 4C 99902: 61.1
LV EJECT FRACT MOD BP 99901: 55.08
LYMPHOCYTES # BLD AUTO: 1.84 K/UL (ref 1–4.8)
LYMPHOCYTES NFR BLD: 22.7 % (ref 22–41)
MCH RBC QN AUTO: 30.8 PG (ref 27–33)
MCHC RBC AUTO-ENTMCNC: 33.2 G/DL (ref 33.7–35.3)
MCV RBC AUTO: 92.7 FL (ref 81.4–97.8)
MONOCYTES # BLD AUTO: 0.78 K/UL (ref 0–0.85)
MONOCYTES NFR BLD AUTO: 9.6 % (ref 0–13.4)
NEUTROPHILS # BLD AUTO: 5.11 K/UL (ref 1.82–7.42)
NEUTROPHILS NFR BLD: 63.2 % (ref 44–72)
NRBC # BLD AUTO: 0 K/UL
NRBC BLD-RTO: 0 /100 WBC
PLATELET # BLD AUTO: 190 K/UL (ref 164–446)
PMV BLD AUTO: 10 FL (ref 9–12.9)
POTASSIUM SERPL-SCNC: 4.4 MMOL/L (ref 3.6–5.5)
PROT SERPL-MCNC: 6.5 G/DL (ref 6–8.2)
PROTHROMBIN TIME: 14.1 SEC (ref 12–14.6)
RBC # BLD AUTO: 5.1 M/UL (ref 4.7–6.1)
SODIUM SERPL-SCNC: 138 MMOL/L (ref 135–145)
TROPONIN T SERPL-MCNC: 24 NG/L (ref 6–19)
TROPONIN T SERPL-MCNC: 27 NG/L (ref 6–19)
TROPONIN T SERPL-MCNC: 31 NG/L (ref 6–19)
TROPONIN T SERPL-MCNC: 32 NG/L (ref 6–19)
TSH SERPL DL<=0.005 MIU/L-ACNC: 4.74 UIU/ML (ref 0.38–5.33)
WBC # BLD AUTO: 8.1 K/UL (ref 4.8–10.8)

## 2020-01-01 PROCEDURE — G0155 HHCP-SVS OF CSW,EA 15 MIN: HCPCS

## 2020-01-01 PROCEDURE — 93306 TTE W/DOPPLER COMPLETE: CPT | Mod: 26 | Performed by: INTERNAL MEDICINE

## 2020-01-01 PROCEDURE — 665999 HH PPS REVENUE DEBIT

## 2020-01-01 PROCEDURE — S9126 HOSPICE CARE, IN THE HOME, P: HCPCS

## 2020-01-01 PROCEDURE — 99285 EMERGENCY DEPT VISIT HI MDM: CPT

## 2020-01-01 PROCEDURE — G0156 HHCP-SVS OF AIDE,EA 15 MIN: HCPCS

## 2020-01-01 PROCEDURE — 665998 HH PPS REVENUE CREDIT

## 2020-01-01 PROCEDURE — 665001 SOC-HOME HEALTH

## 2020-01-01 PROCEDURE — 84484 ASSAY OF TROPONIN QUANT: CPT

## 2020-01-01 PROCEDURE — 6650990 HC HOSPICE AND HOME CARE RX REV CODE 0250

## 2020-01-01 PROCEDURE — G0299 HHS/HOSPICE OF RN EA 15 MIN: HCPCS

## 2020-01-01 PROCEDURE — G0151 HHCP-SERV OF PT,EA 15 MIN: HCPCS

## 2020-01-01 PROCEDURE — 36415 COLL VENOUS BLD VENIPUNCTURE: CPT

## 2020-01-01 PROCEDURE — 665036 HSPC NOTICE OF ELECTION NOE

## 2020-01-01 PROCEDURE — 70450 CT HEAD/BRAIN W/O DYE: CPT

## 2020-01-01 PROCEDURE — 93306 TTE W/DOPPLER COMPLETE: CPT

## 2020-01-01 PROCEDURE — 99220 PR INITIAL OBSERVATION CARE,LEVL III: CPT | Performed by: HOSPITALIST

## 2020-01-01 PROCEDURE — G0153 HHCP-SVS OF S/L PATH,EA 15MN: HCPCS

## 2020-01-01 PROCEDURE — 700105 HCHG RX REV CODE 258: Performed by: HOSPITALIST

## 2020-01-01 PROCEDURE — G0493 RN CARE EA 15 MIN HH/HOSPICE: HCPCS

## 2020-01-01 PROCEDURE — 93880 EXTRACRANIAL BILAT STUDY: CPT

## 2020-01-01 PROCEDURE — 84484 ASSAY OF TROPONIN QUANT: CPT | Mod: 91

## 2020-01-01 PROCEDURE — 96372 THER/PROPH/DIAG INJ SC/IM: CPT

## 2020-01-01 PROCEDURE — 99214 OFFICE O/P EST MOD 30 MIN: CPT | Performed by: FAMILY MEDICINE

## 2020-01-01 PROCEDURE — G0152 HHCP-SERV OF OT,EA 15 MIN: HCPCS

## 2020-01-01 PROCEDURE — 665997 HH PPS REVENUE ADJ

## 2020-01-01 PROCEDURE — 700102 HCHG RX REV CODE 250 W/ 637 OVERRIDE(OP): Performed by: EMERGENCY MEDICINE

## 2020-01-01 PROCEDURE — 80053 COMPREHEN METABOLIC PANEL: CPT

## 2020-01-01 PROCEDURE — 71045 X-RAY EXAM CHEST 1 VIEW: CPT

## 2020-01-01 PROCEDURE — G0495 RN CARE TRAIN/EDU IN HH: HCPCS

## 2020-01-01 PROCEDURE — A9270 NON-COVERED ITEM OR SERVICE: HCPCS | Performed by: HOSPITALIST

## 2020-01-01 PROCEDURE — 93005 ELECTROCARDIOGRAM TRACING: CPT

## 2020-01-01 PROCEDURE — A9270 NON-COVERED ITEM OR SERVICE: HCPCS | Performed by: EMERGENCY MEDICINE

## 2020-01-01 PROCEDURE — G0180 MD CERTIFICATION HHA PATIENT: HCPCS | Performed by: FAMILY MEDICINE

## 2020-01-01 PROCEDURE — 700102 HCHG RX REV CODE 250 W/ 637 OVERRIDE(OP): Performed by: HOSPITALIST

## 2020-01-01 PROCEDURE — G0378 HOSPITAL OBSERVATION PER HR: HCPCS

## 2020-01-01 PROCEDURE — 99217 PR OBSERVATION CARE DISCHARGE: CPT | Performed by: INTERNAL MEDICINE

## 2020-01-01 PROCEDURE — 99213 OFFICE O/P EST LOW 20 MIN: CPT | Performed by: PSYCHIATRY & NEUROLOGY

## 2020-01-01 PROCEDURE — 93224 XTRNL ECG REC UP TO 48 HRS: CPT | Performed by: INTERNAL MEDICINE

## 2020-01-01 PROCEDURE — 700111 HCHG RX REV CODE 636 W/ 250 OVERRIDE (IP): Performed by: HOSPITALIST

## 2020-01-01 PROCEDURE — 94760 N-INVAS EAR/PLS OXIMETRY 1: CPT

## 2020-01-01 PROCEDURE — 82533 TOTAL CORTISOL: CPT

## 2020-01-01 PROCEDURE — 93005 ELECTROCARDIOGRAM TRACING: CPT | Performed by: EMERGENCY MEDICINE

## 2020-01-01 PROCEDURE — 85025 COMPLETE CBC W/AUTO DIFF WBC: CPT

## 2020-01-01 PROCEDURE — 84443 ASSAY THYROID STIM HORMONE: CPT

## 2020-01-01 PROCEDURE — 85610 PROTHROMBIN TIME: CPT

## 2020-01-01 RX ORDER — HEPARIN SODIUM 5000 [USP'U]/ML
5000 INJECTION, SOLUTION INTRAVENOUS; SUBCUTANEOUS EVERY 8 HOURS
Status: DISCONTINUED | OUTPATIENT
Start: 2020-01-01 | End: 2020-01-01 | Stop reason: HOSPADM

## 2020-01-01 RX ORDER — POLYETHYLENE GLYCOL 3350 17 G/17G
1 POWDER, FOR SOLUTION ORAL
Status: DISCONTINUED | OUTPATIENT
Start: 2020-01-01 | End: 2020-01-01 | Stop reason: HOSPADM

## 2020-01-01 RX ORDER — DORZOLAMIDE HYDROCHLORIDE AND TIMOLOL MALEATE 20; 5 MG/ML; MG/ML
1 SOLUTION/ DROPS OPHTHALMIC 2 TIMES DAILY
Qty: 1 BOTTLE | OUTPATIENT
Start: 2020-01-01

## 2020-01-01 RX ORDER — LOSARTAN POTASSIUM 50 MG/1
50 TABLET ORAL DAILY
Status: DISCONTINUED | OUTPATIENT
Start: 2020-01-01 | End: 2020-01-01 | Stop reason: HOSPADM

## 2020-01-01 RX ORDER — ATORVASTATIN CALCIUM 20 MG/1
20 TABLET, FILM COATED ORAL EVERY EVENING
Status: DISCONTINUED | OUTPATIENT
Start: 2020-01-01 | End: 2020-01-01 | Stop reason: HOSPADM

## 2020-01-01 RX ORDER — ESOMEPRAZOLE MAGNESIUM 40 MG/1
CAPSULE, DELAYED RELEASE ORAL
Qty: 90 CAP | Refills: 3 | Status: SHIPPED | OUTPATIENT
Start: 2020-01-01 | End: 2020-01-01

## 2020-01-01 RX ORDER — LOSARTAN POTASSIUM 50 MG/1
50 TABLET ORAL DAILY
COMMUNITY
End: 2020-01-01 | Stop reason: SDUPTHER

## 2020-01-01 RX ORDER — OMEPRAZOLE 20 MG/1
20 CAPSULE, DELAYED RELEASE ORAL DAILY
Status: DISCONTINUED | OUTPATIENT
Start: 2020-01-01 | End: 2020-01-01 | Stop reason: HOSPADM

## 2020-01-01 RX ORDER — CARBIDOPA 25 MG/1
1 TABLET ORAL DAILY
Qty: 90 TAB | Refills: 3 | Status: SHIPPED | OUTPATIENT
Start: 2020-01-01

## 2020-01-01 RX ORDER — AMOXICILLIN 250 MG
2 CAPSULE ORAL 2 TIMES DAILY
Status: DISCONTINUED | OUTPATIENT
Start: 2020-01-01 | End: 2020-01-01 | Stop reason: HOSPADM

## 2020-01-01 RX ORDER — TRAZODONE HYDROCHLORIDE 50 MG/1
100 TABLET ORAL EVERY EVENING
Qty: 180 TAB | Refills: 1 | Status: SHIPPED | OUTPATIENT
Start: 2020-01-01

## 2020-01-01 RX ORDER — TRAZODONE HYDROCHLORIDE 50 MG/1
25 TABLET ORAL
Qty: 90 TAB | Refills: 3 | Status: SHIPPED | OUTPATIENT
Start: 2020-01-01 | End: 2020-01-01 | Stop reason: SDUPTHER

## 2020-01-01 RX ORDER — TRAZODONE HYDROCHLORIDE 50 MG/1
50 TABLET ORAL
Qty: 90 TAB | Refills: 3
Start: 2020-01-01 | End: 2020-01-01

## 2020-01-01 RX ORDER — TRAZODONE HYDROCHLORIDE 50 MG/1
50 TABLET ORAL
Qty: 90 TAB | Refills: 3 | Status: SHIPPED | OUTPATIENT
Start: 2020-01-01 | End: 2020-01-01 | Stop reason: SDUPTHER

## 2020-01-01 RX ORDER — ESOMEPRAZOLE MAGNESIUM 40 MG/1
40 CAPSULE, DELAYED RELEASE ORAL
Status: DISCONTINUED | OUTPATIENT
Start: 2020-01-01 | End: 2020-01-01

## 2020-01-01 RX ORDER — LOSARTAN POTASSIUM 50 MG/1
50 TABLET ORAL DAILY
Qty: 90 TAB | Refills: 1 | Status: SHIPPED | OUTPATIENT
Start: 2020-01-01 | End: 2020-01-01 | Stop reason: SDUPTHER

## 2020-01-01 RX ORDER — ESOMEPRAZOLE MAGNESIUM 40 MG/1
CAPSULE, DELAYED RELEASE ORAL
Qty: 90 CAP | Refills: 3 | Status: SHIPPED | OUTPATIENT
Start: 2020-01-01 | End: 2020-01-01 | Stop reason: SDUPTHER

## 2020-01-01 RX ORDER — QUETIAPINE FUMARATE 25 MG/1
25 TABLET, FILM COATED ORAL EVERY EVENING
Status: DISCONTINUED | OUTPATIENT
Start: 2020-01-01 | End: 2020-01-01

## 2020-01-01 RX ORDER — ATORVASTATIN CALCIUM 20 MG/1
20 TABLET, FILM COATED ORAL NIGHTLY
COMMUNITY
End: 2020-01-01

## 2020-01-01 RX ORDER — SODIUM CHLORIDE 9 MG/ML
INJECTION, SOLUTION INTRAVENOUS CONTINUOUS
Status: DISCONTINUED | OUTPATIENT
Start: 2020-01-01 | End: 2020-01-01 | Stop reason: HOSPADM

## 2020-01-01 RX ORDER — ATORVASTATIN CALCIUM 20 MG/1
TABLET, FILM COATED ORAL
Qty: 90 TAB | Refills: 3 | Status: SHIPPED | OUTPATIENT
Start: 2020-01-01 | End: 2020-01-01

## 2020-01-01 RX ORDER — ATORVASTATIN CALCIUM 20 MG/1
TABLET, FILM COATED ORAL
Qty: 90 TAB | Refills: 3 | Status: SHIPPED | OUTPATIENT
Start: 2020-01-01 | End: 2020-01-01 | Stop reason: SDUPTHER

## 2020-01-01 RX ORDER — ESOMEPRAZOLE MAGNESIUM 40 MG/1
40 CAPSULE, DELAYED RELEASE ORAL
COMMUNITY
End: 2020-01-01

## 2020-01-01 RX ORDER — LOSARTAN POTASSIUM 50 MG/1
25 TABLET ORAL DAILY
Qty: 90 TAB | Refills: 1
Start: 2020-01-01 | End: 2020-01-01

## 2020-01-01 RX ORDER — CARBIDOPA 25 MG/1
1 TABLET ORAL SEE ADMIN INSTRUCTIONS
Qty: 90 TAB | Refills: 3 | Status: CANCELLED | OUTPATIENT
Start: 2020-01-01

## 2020-01-01 RX ORDER — CARBIDOPA 25 MG/1
1 TABLET ORAL DAILY
Qty: 30 TAB | Refills: 1 | Status: SHIPPED | OUTPATIENT
Start: 2020-01-01 | End: 2020-01-01

## 2020-01-01 RX ORDER — BISACODYL 10 MG
10 SUPPOSITORY, RECTAL RECTAL
Status: DISCONTINUED | OUTPATIENT
Start: 2020-01-01 | End: 2020-01-01 | Stop reason: HOSPADM

## 2020-01-01 RX ADMIN — SODIUM CHLORIDE: 9 INJECTION, SOLUTION INTRAVENOUS at 04:08

## 2020-01-01 RX ADMIN — SODIUM CHLORIDE: 9 INJECTION, SOLUTION INTRAVENOUS at 23:21

## 2020-01-01 RX ADMIN — HEPARIN SODIUM 5000 UNITS: 5000 INJECTION, SOLUTION INTRAVENOUS; SUBCUTANEOUS at 04:31

## 2020-01-01 RX ADMIN — LOSARTAN POTASSIUM 50 MG: 50 TABLET, FILM COATED ORAL at 07:56

## 2020-01-01 RX ADMIN — HEPARIN SODIUM 5000 UNITS: 5000 INJECTION, SOLUTION INTRAVENOUS; SUBCUTANEOUS at 23:19

## 2020-01-01 RX ADMIN — CARBIDOPA AND LEVODOPA 1 TABLET: 25; 100 TABLET ORAL at 07:52

## 2020-01-01 RX ADMIN — OMEPRAZOLE 20 MG: 20 CAPSULE, DELAYED RELEASE ORAL at 04:30

## 2020-01-01 SDOH — ECONOMIC STABILITY: GENERAL

## 2020-01-01 SDOH — ECONOMIC STABILITY: HOUSING INSECURITY: HOME SAFETY: WIFE HAS ORDERED NECESARY DME

## 2020-01-01 ASSESSMENT — ENCOUNTER SYMPTOMS
MUSCLE WEAKNESS: 1
SLEEP QUALITY: FAIR
STOOL FREQUENCY: LESS THAN DAILY
SYNCOPE: 1
LAST BOWEL MOVEMENT: 65723
FORGETFULNESS: 1
FATIGUES EASILY: 1
BOWEL INCONTINENCE: 1
FATIGUES EASILY: 1
BOWEL INCONTINENCE: 1
TREMORS: 1
SLEEP QUALITY: POOR
DIFFICULTY THINKING: 1
FATIGUE: 1
FORGETFULNESS: 1
FORGETFULNESS: 1
LIMITED RANGE OF MOTION: 1
BOWEL INCONTINENCE: 1
NAUSEA: DENIES
BOWEL INCONTINENCE: 1
CONSTIPATION: 1
FORGETFULNESS: 1
MUSCLE WEAKNESS: 1
SLEEP QUALITY: POOR
SHORTNESS OF BREATH: 0
CONSTIPATION: 1
FATIGUES EASILY: 1
DEPRESSION: 0
DESCRIPTION OF MEMORY LOSS: SHORT TERM
DIFFICULTY THINKING: 1
DESCRIPTION OF MEMORY LOSS: SHORT TERM
DIFFICULTY THINKING: 1
SENSORY CHANGE: 0
SLEEP QUALITY: POOR
SLEEP QUALITY: FAIR
DRY SKIN: 1
POOR JUDGMENT: 1
CONSTIPATION: 1
DESCRIPTION OF MEMORY LOSS: SHORT TERM
HALLUCINATIONS: 0
DIFFICULTY THINKING: 1
FORGETFULNESS: 1
LAST BOWEL MOVEMENT: 65732
DRY SKIN: 1
BOWEL INCONTINENCE: 1
MUSCLE WEAKNESS: 1
PALPITATIONS: 0
NAUSEA: DENIES
DIFFICULTY THINKING: 1
LAST BOWEL MOVEMENT: 65639
FATIGUES EASILY: 1
FATIGUES EASILY: 1
FATIGUE: 1
FLANK PAIN: 0
FORGETFULNESS: 1
DIFFICULTY THINKING: 1
DIFFICULTY THINKING: 1
TREMORS: 1
TREMORS: 1
STOOL FREQUENCY: LESS THAN DAILY
HEARTBURN: 0
CONSTIPATION: 0
BLURRED VISION: 0
BOWEL INCONTINENCE: 1
BRUISES/BLEEDS EASILY: 0
LAST BOWEL MOVEMENT: 65636
CONSTIPATION: 1
MUSCLE WEAKNESS: 1
FORGETFULNESS: 1
DESCRIPTION OF MEMORY LOSS: SHORT TERM
LAST BOWEL MOVEMENT: 65611
NERVOUS/ANXIOUS: 1
EYE DISCHARGE: 0
DIFFICULTY THINKING: 1
LAST BOWEL MOVEMENT: 65716
DRY SKIN: 1
FATIGUES EASILY: 1
TREMORS: 1
DESCRIPTION OF MEMORY LOSS: SHORT TERM
BOWEL INCONTINENCE: 1
FATIGUES EASILY: 1
SLEEP QUALITY: FAIR
FATIGUE: 1
HEMOPTYSIS: 0
SLEEP QUALITY: FAIR
FATIGUE: 1
DESCRIPTION OF MEMORY LOSS: SHORT TERM
FALLS: 0
POOR JUDGMENT: 1
SLEEP QUALITY: POOR
SLEEP QUALITY: POOR
FATIGUE: 1
LIMITED RANGE OF MOTION: 1
SYNCOPE: 1
SLEEP QUALITY: POOR
FORGETFULNESS: 1
DRY SKIN: 1
LAST BOWEL MOVEMENT: 65708
STOOL FREQUENCY: LESS THAN DAILY
POOR JUDGMENT: 1
DIFFICULTY THINKING: 1
STOOL FREQUENCY: LESS THAN DAILY
CONSTIPATION: 1
MEMORY LOSS: 1
MUSCLE WEAKNESS: 1
CHILLS: 0
DRY SKIN: 1
STOOL FREQUENCY: LESS THAN DAILY
DIFFICULTY THINKING: 1
FATIGUES EASILY: 1
FATIGUE: 1
BOWEL INCONTINENCE: 1
CONSTIPATION: 1
LIMITED RANGE OF MOTION: 1
FORGETFULNESS: 1
DESCRIPTION OF MEMORY LOSS: SHORT TERM
CONSTIPATION: 1
DRY SKIN: 1
WEAKNESS: 0
NAUSEA: 0
BOWEL INCONTINENCE: 1
CONSTIPATION: 1
MUSCLE WEAKNESS: 1
VOMITING: DENIES
POOR JUDGMENT: 1
STOOL FREQUENCY: LESS THAN DAILY
LAST BOWEL MOVEMENT: 65673
LAST BOWEL MOVEMENT: 65732
LIMITED RANGE OF MOTION: 1
BOWEL INCONTINENCE: 1
POOR JUDGMENT: 1
CONSTIPATION: 1
NAUSEA: DENIES
FORGETFULNESS: 1
SLEEP QUALITY: POOR
LAST BOWEL MOVEMENT: 65632
DIFFICULTY THINKING: 1
TREMORS: 1
DESCRIPTION OF MEMORY LOSS: SHORT TERM
DRY SKIN: 1
VOMITING: DENIS
FATIGUE: 1
STOOL FREQUENCY: LESS THAN DAILY
BOWEL INCONTINENCE: 1
BOWEL INCONTINENCE: 1
TREMORS: 1
VOMITING: DENIES
STOOL FREQUENCY: LESS THAN DAILY
FATIGUES EASILY: 1
BOWEL INCONTINENCE: 1
DIZZINESS: 0
FATIGUES EASILY: 1
STOOL FREQUENCY: LESS THAN DAILY
FATIGUES EASILY: 1
SLEEP QUALITY: FAIR
DRY SKIN: 1
FORGETFULNESS: 1
MYALGIAS: 0
BOWEL INCONTINENCE: 1
LAST BOWEL MOVEMENT: 65614
SLEEP QUALITY: POOR
TREMORS: 1
POOR JUDGMENT: 1
CONSTIPATION: 1
DRY SKIN: 1
TREMORS: 1
DESCRIPTION OF MEMORY LOSS: SHORT TERM
DIFFICULTY THINKING: 1
POOR JUDGMENT: 1
TREMORS: 1
STOOL FREQUENCY: LESS THAN DAILY
LAST BOWEL MOVEMENT: 65701
TREMORS: 1
FATIGUE: 1
FATIGUE: 1
TREMORS: 1
DESCRIPTION OF MEMORY LOSS: SHORT TERM
LAST BOWEL MOVEMENT: 65688
SPEECH CHANGE: 0
VOMITING: DENIES
DIFFICULTY THINKING: 1
TREMORS: 1
FATIGUE: 1
DRY SKIN: 1
DRY SKIN: 1
SLEEP QUALITY: POOR
FATIGUE: 1
FORGETFULNESS: 1
DRY SKIN: 1
BOWEL INCONTINENCE: 1
LAST BOWEL MOVEMENT: 65691
LIMITED RANGE OF MOTION: 1
CONSTIPATION: 1
LAST BOWEL MOVEMENT: 65653
POOR JUDGMENT: 1
POOR JUDGMENT: 1
DESCRIPTION OF MEMORY LOSS: SHORT TERM
DIFFICULTY THINKING: 1
STOOL FREQUENCY: LESS THAN DAILY
FATIGUE: 1
NAUSEA: DENIES
DRY SKIN: 1
CONSTIPATION: 1
NAUSEA: PT DENIES
DRY SKIN: 1
POOR JUDGMENT: 1
FATIGUES EASILY: 1
FORGETFULNESS: 1
MUSCLE WEAKNESS: 1
FATIGUES EASILY: 1
DESCRIPTION OF MEMORY LOSS: SHORT TERM
FATIGUES EASILY: 1
APPETITE LEVEL: FAIR
DIFFICULTY THINKING: 1
BOWEL INCONTINENCE: 1
TREMORS: 1
POOR JUDGMENT: 1
LOSS OF CONSCIOUSNESS: 1
FATIGUES EASILY: 1
VOMITING: DENIES
TREMORS: 1
TREMORS: 1
LIMITED RANGE OF MOTION: 1
CONSTIPATION: 1
POOR JUDGMENT: 1
SLEEP QUALITY: POOR
NAUSEA: DENIES
FATIGUES EASILY: 1
SLEEP QUALITY: POOR
FATIGUE: 1
COUGH: 1
FEVER: 0
FATIGUE: 1
CONSTIPATION: 1
STOOL FREQUENCY: LESS THAN DAILY
POOR JUDGMENT: 1
CONSTIPATION: 1
DIFFICULTY THINKING: 1
CONSTIPATION: 1
SLEEP QUALITY: FAIR
MUSCLE WEAKNESS: 1
FATIGUE: 1
FORGETFULNESS: 1
TREMORS: 1
FORGETFULNESS: 1
STOOL FREQUENCY: LESS THAN DAILY
FORGETFULNESS: 1
CHANGE IN APPETITE: VARYING
FATIGUES EASILY: 1
POOR JUDGMENT: 1
SLEEP QUALITY: FAIR
BOWEL INCONTINENCE: 1
ABDOMINAL PAIN: 0
LAST BOWEL MOVEMENT: 65728
STOOL FREQUENCY: LESS THAN DAILY
LIMITED RANGE OF MOTION: 1
POOR JUDGMENT: 1
BOWEL INCONTINENCE: 1
LAST BOWEL MOVEMENT: 65660
STOOL FREQUENCY: LESS THAN DAILY
SLEEP QUALITY: POOR
LAST BOWEL MOVEMENT: 65618
TREMORS: 1
SLEEP QUALITY: POOR
SLEEP QUALITY: FAIR
DIFFICULTY THINKING: 1
INSOMNIA: 0
DRY SKIN: 1
FATIGUES EASILY: 1
LAST BOWEL MOVEMENT: 65732
MUSCLE WEAKNESS: 1
FATIGUE: 1
HYPOTENSION: 1
DRY SKIN: 1
STOOL FREQUENCY: LESS THAN DAILY
DESCRIPTION OF MEMORY LOSS: SHORT TERM
BOWEL INCONTINENCE: 1
COUGH: 0
FORGETFULNESS: 1
DESCRIPTION OF MEMORY LOSS: SHORT TERM
STOOL FREQUENCY: LESS THAN DAILY
VOMITING: DENIES
FATIGUES EASILY: 1
FOCAL WEAKNESS: 0
FATIGUE: 1
BOWEL INCONTINENCE: 1
FORGETFULNESS: 1
FATIGUE: 1
MUSCLE WEAKNESS: 1
POOR JUDGMENT: 1
DESCRIPTION OF MEMORY LOSS: SHORT TERM
FORGETFULNESS: 1
TREMORS: 1
STOOL FREQUENCY: LESS THAN DAILY
STOOL FREQUENCY: LESS THAN DAILY
CONSTIPATION: 1
DOUBLE VISION: 0
STOOL FREQUENCY: LESS THAN DAILY
DRY SKIN: 1
LAST BOWEL MOVEMENT: 65646
POOR JUDGMENT: 1
MUSCLE WEAKNESS: 1
TREMORS: 1
FATIGUE: 1
VOMITING: 0
SLEEP QUALITY: FAIR
VOMITING: PT DENIES
DESCRIPTION OF MEMORY LOSS: SHORT TERM
POOR JUDGMENT: 1
STOOL FREQUENCY: LESS THAN DAILY
TREMORS: 1
LAST BOWEL MOVEMENT: 65666
CONSTIPATION: 1
STOOL FREQUENCY: LESS THAN DAILY
BOWEL INCONTINENCE: 1
FATIGUE: 1
SLEEP QUALITY: POOR
DESCRIPTION OF MEMORY LOSS: SHORT TERM
FATIGUE: 1
SNORING: 1
POOR JUDGMENT: 1
BOWEL INCONTINENCE: 1
FATIGUES EASILY: 1
DIFFICULTY THINKING: 1
LAST BOWEL MOVEMENT: 65730
DRY SKIN: 1
FATIGUES EASILY: 1
BOWEL INCONTINENCE: 1
NAUSEA: DENIES
TREMORS: 1
STOOL FREQUENCY: LESS THAN DAILY
FORGETFULNESS: 1
LAST BOWEL MOVEMENT: 65625
DESCRIPTION OF MEMORY LOSS: SHORT TERM
DIFFICULTY THINKING: 1
DRY SKIN: 1
TREMORS: 1
SYNCOPE: 1
POOR JUDGMENT: 1
CONSTIPATION: 1

## 2020-01-01 ASSESSMENT — COGNITIVE AND FUNCTIONAL STATUS - GENERAL
DAILY ACTIVITIY SCORE: 20
MOBILITY SCORE: 18
CLIMB 3 TO 5 STEPS WITH RAILING: A LITTLE
HELP NEEDED FOR BATHING: A LITTLE
SUGGESTED CMS G CODE MODIFIER MOBILITY: CK
TURNING FROM BACK TO SIDE WHILE IN FLAT BAD: A LITTLE
DRESSING REGULAR LOWER BODY CLOTHING: A LITTLE
STANDING UP FROM CHAIR USING ARMS: A LITTLE
SUGGESTED CMS G CODE MODIFIER DAILY ACTIVITY: CJ
DRESSING REGULAR UPPER BODY CLOTHING: A LITTLE
MOVING FROM LYING ON BACK TO SITTING ON SIDE OF FLAT BED: A LITTLE
MOVING TO AND FROM BED TO CHAIR: A LITTLE
TOILETING: A LITTLE
WALKING IN HOSPITAL ROOM: A LITTLE

## 2020-01-01 ASSESSMENT — ACTIVITIES OF DAILY LIVING (ADL)
HOME_HEALTH_OASIS: 01
AMBULATION ASSISTANCE: STAND BY ASSIST
MONEY MANAGEMENT (EXPENSES/BILLS): TOTALLY DEPENDENT
CURRENT_FUNCTION: STAND BY ASSIST
OASIS_M1830: 03
TOILETING: STAND BY ASSIST
FEEDING: SUPERVISION
AMBULATION ASSISTANCE: STAND BY ASSIST
CURRENT_FUNCTION: STAND BY ASSIST
MONEY MANAGEMENT (EXPENSES/BILLS): TOTALLY DEPENDENT
MONEY MANAGEMENT (EXPENSES/BILLS): TOTALLY DEPENDENT
AMBULATION ASSISTANCE: ONE PERSON
MONEY MANAGEMENT (EXPENSES/BILLS): TOTALLY DEPENDENT
BATHING ASSESSED: 1
BATHING_REQUIRES_ASSISTANCE: 1
CURRENT_FUNCTION: STAND BY ASSIST
MONEY MANAGEMENT (EXPENSES/BILLS): TOTALLY DEPENDENT
AMBULATION ASSISTANCE: STAND BY ASSIST
MONEY MANAGEMENT (EXPENSES/BILLS): TOTALLY DEPENDENT
CURRENT_FUNCTION: ONE PERSON
CURRENT_FUNCTION: STAND BY ASSIST
CURRENT_FUNCTION: STAND BY ASSIST
MONEY MANAGEMENT (EXPENSES/BILLS): TOTALLY DEPENDENT
AMBULATION ASSISTANCE: CONTACT GUARD ASSIST
DRESSING_LB_CURRENT_FUNCTION: MODERATE ASSIST
AMBULATION ASSISTANCE ON FLAT SURFACES: 1
AMBULATION ASSISTANCE: 1
PHYSICAL_TRANSFER_REQUIRES_ASSISTANCE: 1
MONEY MANAGEMENT (EXPENSES/BILLS): TOTALLY DEPENDENT
AMBULATION ASSISTANCE: STAND BY ASSIST
PHYSICAL TRANSFERS ASSESSED: 1
OASIS_M1830: 03
TRANSPORTATION COMMENTS: REQUIRES ASSIST OF ANOTHER PERSON AND AD OUT OF HOME ON UNEVEN SURFACES
MONEY MANAGEMENT (EXPENSES/BILLS): TOTALLY DEPENDENT
AMBULATION ASSISTANCE: STAND BY ASSIST
FEEDING ASSESSED: 1
CURRENT_FUNCTION: CONTACT GUARD ASSIST
MONEY MANAGEMENT (EXPENSES/BILLS): TOTALLY DEPENDENT
AMBULATION ASSISTANCE: STAND BY ASSIST
DRESSING_UB_CURRENT_FUNCTION: MINIMUM ASSIST
MONEY MANAGEMENT (EXPENSES/BILLS): TOTALLY DEPENDENT
MONEY MANAGEMENT (EXPENSES/BILLS): TOTALLY DEPENDENT
TRANSPORTATION COMMENTS: TREMORS
BATHING_CURRENT_FUNCTION: MINIMUM ASSIST
MONEY MANAGEMENT (EXPENSES/BILLS): TOTALLY DEPENDENT
AMBULATION ASSISTANCE: STAND BY ASSIST
MONEY MANAGEMENT (EXPENSES/BILLS): TOTALLY DEPENDENT
MONEY MANAGEMENT (EXPENSES/BILLS): TOTALLY DEPENDENT
GROOMING ASSESSED: 1
CURRENT_FUNCTION: ONE PERSON
TOILETING: 1
GROOMING_CURRENT_FUNCTION: MODERATE ASSIST
TRANSPORTATION: DEPENDENT
CONTINENCE_REQUIRES_ASSISTANCE: 1
MONEY MANAGEMENT (EXPENSES/BILLS): TOTALLY DEPENDENT
CURRENT_FUNCTION: STAND BY ASSIST
AMBULATION ASSISTANCE: ONE PERSON
AMBULATION ASSISTANCE: STAND BY ASSIST
DRESSING_REQUIRES_ASSISTANCE: 1
TRANSPORTATION ASSESSED: 1

## 2020-01-01 ASSESSMENT — PATIENT HEALTH QUESTIONNAIRE - PHQ9
CLINICAL INTERPRETATION OF PHQ2 SCORE: 2
2. FEELING DOWN, DEPRESSED, IRRITABLE, OR HOPELESS: NOT AT ALL
SUM OF ALL RESPONSES TO PHQ QUESTIONS 1-9: 10
2. FEELING DOWN, DEPRESSED, IRRITABLE, OR HOPELESS: 00
CLINICAL INTERPRETATION OF PHQ2 SCORE: 0
1. LITTLE INTEREST OR PLEASURE IN DOING THINGS: NOT AT ALL
5. POOR APPETITE OR OVEREATING: 2 - MORE THAN HALF THE DAYS
CLINICAL INTERPRETATION OF PHQ2 SCORE: 0
SUM OF ALL RESPONSES TO PHQ9 QUESTIONS 1 AND 2: 0
1. LITTLE INTEREST OR PLEASURE IN DOING THINGS: 02

## 2020-01-01 ASSESSMENT — LIFESTYLE VARIABLES
AVERAGE NUMBER OF DAYS PER WEEK YOU HAVE A DRINK CONTAINING ALCOHOL: 0
TOTAL SCORE: 0
HAVE PEOPLE ANNOYED YOU BY CRITICIZING YOUR DRINKING: NO
TOTAL SCORE: 0
TOTAL SCORE: 0
EVER_SMOKED: NEVER
ALCOHOL_USE: YES
CONSUMPTION TOTAL: NEGATIVE
EVER HAD A DRINK FIRST THING IN THE MORNING TO STEADY YOUR NERVES TO GET RID OF A HANGOVER: NO
HOW MANY TIMES IN THE PAST YEAR HAVE YOU HAD 5 OR MORE DRINKS IN A DAY: 0
SUBSTANCE_ABUSE: 0
ON A TYPICAL DAY WHEN YOU DRINK ALCOHOL HOW MANY DRINKS DO YOU HAVE: 1
HAVE YOU EVER FELT YOU SHOULD CUT DOWN ON YOUR DRINKING: NO
EVER FELT BAD OR GUILTY ABOUT YOUR DRINKING: NO
DOES PATIENT WANT TO STOP DRINKING: NO

## 2020-01-01 ASSESSMENT — FIBROSIS 4 INDEX
FIB4 SCORE: 3.9
FIB4 SCORE: 3.9

## 2020-01-18 PROBLEM — R55 SYNCOPE: Status: ACTIVE | Noted: 2020-01-01

## 2020-01-18 PROBLEM — R79.89 ELEVATED TROPONIN: Status: ACTIVE | Noted: 2020-01-01

## 2020-01-19 PROBLEM — R79.89 ELEVATED TROPONIN: Status: RESOLVED | Noted: 2020-01-01 | Resolved: 2020-01-01

## 2020-01-19 PROBLEM — R55 SYNCOPE: Status: RESOLVED | Noted: 2020-01-01 | Resolved: 2020-01-01

## 2020-01-19 NOTE — PROGRESS NOTES
2 RN skin check complete with Ty RN.   Devices in place none.  Skin assessed under devices intact.  Confirmed pressure ulcers found on none.  New potential pressure ulcers noted on none.  The following interventions in place: Pillows in use for support and positioning. Pt turns self from side to side.    Ears pink and blanching.  Elbows pink and blanching.  Sacrum pink and blanching.  Heels and toes pink and blanching.  Generalized bruising.

## 2020-01-19 NOTE — PROGRESS NOTES
Pt arrived to unit with myself from ED. Pt oriented to room, unit, and plan of care. Tele-monitor placed and monitor room notified. All questions answered at this time. Call light within reach; fall precautions in place.

## 2020-01-19 NOTE — DISCHARGE SUMMARY
Discharge Summary    CHIEF COMPLAINT ON ADMISSION  Chief Complaint   Patient presents with   • Altered Mental Status   • Syncope       Reason for Admission  EMS     Admission Date  1/18/2020    CODE STATUS  Full Code    HPI & HOSPITAL COURSE  This is a 79 y.o. male here with above medical issues. Patient was admitted to the telemetry floor and no evidence of arrhythmias were detected. Patient had a normal ECHO and orthostatics as well. There was no recurrence of his symptoms. Patient has pronounced Parkinson's disease and only recent medication change was ~1 month ago that included discontinuing requip (given severe induced hallucinations) and mild dose reduction of sinemet. Patient's wife informed that a similar episode happened about a year ago with similar social situation with lots of stimulation. I suspect this episode of syncope is environmentally induced and he could benefit from outpatient holter monitoring if it became recurrent. No evidence of infection either.         Therefore, he is discharged in fair and stable condition to home with close outpatient follow-up.      Discharge Date  1/19/2020    FOLLOW UP ITEMS POST DISCHARGE  F/u with his PCP in 1 week  F/U with Dr Brown of neurology in 1-2 months    DISCHARGE DIAGNOSES  Principal Problem (Resolved):    Syncope POA: Yes  Active Problems:    Parkinson disease (HCC) POA: Yes    Essential hypertension POA: Yes    Mixed hyperlipidemia POA: Yes    GENIA (obstructive sleep apnea) POA: Yes    Dementia associated with other underlying disease without behavioral disturbance (HCC) POA: Yes  Resolved Problems:    Elevated troponin POA: Yes      FOLLOW UP  Future Appointments   Date Time Provider Department Center   3/6/2020 10:20 AM Edwar Beal M.D. [facility] RAQUEL Beal M.D.  [address]  [facility] [address]  [facility] [address]  [phone]    Schedule an appointment as soon as possible for a visit in 1 week  Hospital follow-up  appointment with PCP      MEDICATIONS ON DISCHARGE     Medication List      CONTINUE taking these medications      Instructions   atorvastatin 20 MG Tabs  Commonly known as:  LIPITOR   Take 20 mg by mouth every evening.  Dose:  20 mg     carbidopa-levodopa  MG Tabs  Commonly known as:  SINEMET   Take 1 Tab by mouth 2 Times a Day. @@ 0800 and 1600.  Dose:  1 Tab     dorzolamide-timolol 22.3-6.8 MG/ML Soln  Commonly known as:  COSOPT   Place 1 Drop in both eyes 2 times a day.  Dose:  1 Drop     latanoprost 0.005 % Soln  Commonly known as:  XALATAN   Place 1 Drop in both eyes every bedtime.  Dose:  1 Drop     losartan 50 MG Tabs  Commonly known as:  COZAAR   Take 50 mg by mouth every day.  Dose:  50 mg     NEXIUM 40 MG delayed-release capsule  Generic drug:  esomeprazole   Take 40 mg by mouth every morning before breakfast.  Dose:  40 mg            Allergies  No Known Allergies    DIET  Orders Placed This Encounter   Procedures   • Diet Order Regular     Standing Status:   Standing     Number of Occurrences:   1     Order Specific Question:   Diet:     Answer:   Regular [1]       ACTIVITY  As tolerated.  Weight bearing as tolerated    CONSULTATIONS  NONE    PROCEDURES  EC-ECHOCARDIOGRAM COMPLETE W/O CONT   Final Result      DX-CHEST-PORTABLE (1 VIEW)   Final Result      No acute cardiopulmonary abnormality.      CT-HEAD W/O   Final Result      1. No CT evidence of acute infarct, hemorrhage or mass.   2. Moderate to severe global parenchymal atrophy. Chronic small vessel ischemic changes.            LABORATORY  Lab Results   Component Value Date    SODIUM 138 01/18/2020    POTASSIUM 4.4 01/18/2020    CHLORIDE 106 01/18/2020    CO2 22 01/18/2020    GLUCOSE 103 (H) 01/18/2020    BUN 17 01/18/2020    CREATININE 1.04 01/18/2020        Lab Results   Component Value Date    WBC 8.1 01/18/2020    HEMOGLOBIN 15.7 01/18/2020    HEMATOCRIT 47.3 01/18/2020    PLATELETCT 190 01/18/2020        Total time of the discharge  process exceeds 34 minutes.

## 2020-01-19 NOTE — ED TRIAGE NOTES
Pt was sitting at dinner at around 1815 and had a sudden onset change in LOC. Was sleeping, drooling, per wife unable to wake him. Episode of incontinence. Initially pt was slow to follow commands, only oriented x 2. At this time answering orientation questions appropriately however responses slightly delayed speech slightly slurred. Generalized weakness

## 2020-01-19 NOTE — ASSESSMENT & PLAN NOTE
Unclear etiology, mild elevated troponin with out acute changes on EKG  He denies any chest pain, or preceding symptoms beside dizziness but he also has dementia at baseline   Admit to telemetery   Serial troponin   Orthostatic BP   Cortisol levels  Check echocardiogram   consider further imaging with carotid ultrasound as appropriate

## 2020-01-19 NOTE — H&P
Hospital Medicine History & Physical Note    Date of Service  1/18/2020    Primary Care Physician  Edwar Beal M.D.    Consultants  none    Code Status  full    Chief Complaint  Syncope     History of Presenting Illness  79 y.o. male who presented 1/18/2020 with past medical history of hypertension, hyperlipidemia, Parkinson's disease who presents with syncopal event.  This patient was sitting at the dinner table with some friends.  He was sitting there slumped over and lost consciousness.  He was not responding.  He did have some dizziness prior to the loss of consciousness.  Took him about a minute to respond.  Subsequently he did not appear confused.  He had no shaking during the loss of consciousness.  No tongue biting no urinary or bowel incontinence.  Otherwise he denies any chest pain or shortness of breath.  He will be admitted to the hospital for further syncope work-up.    Review of Systems  Review of Systems   Constitutional: Positive for malaise/fatigue. Negative for chills and fever.   HENT: Negative for congestion, hearing loss and tinnitus.    Eyes: Negative for blurred vision, double vision and discharge.   Respiratory: Negative for cough, hemoptysis and shortness of breath.    Cardiovascular: Negative for chest pain, palpitations and leg swelling.   Gastrointestinal: Negative for abdominal pain, heartburn, nausea and vomiting.   Genitourinary: Negative for dysuria and flank pain.   Musculoskeletal: Negative for joint pain and myalgias.   Skin: Negative for rash.   Neurological: Positive for tremors and loss of consciousness. Negative for dizziness, sensory change, speech change, focal weakness and weakness.   Endo/Heme/Allergies: Negative for environmental allergies. Does not bruise/bleed easily.   Psychiatric/Behavioral: Negative for depression, hallucinations and substance abuse.       Past Medical History   has a past medical history of Chickenpox, Daytime sleepiness, GERD  (gastroesophageal reflux disease), Glaucoma, Heartburn, Hyperlipidemia, Hypertension, Kidney stone, Mumps, Muscle disorder, Sleep apnea, and Wears glasses.    Surgical History   has a past surgical history that includes arthroscopy, knee.     Family History  family history includes Diabetes in his paternal grandfather; Lung Disease in his father.     Social History   reports that he has never smoked. He has never used smokeless tobacco. He reports current alcohol use. He reports that he does not use drugs.    Allergies  No Known Allergies    Medications  Prior to Admission Medications   Prescriptions Last Dose Informant Patient Reported? Taking?   Cholecalciferol 4000 units Cap   Yes No   Sig: Take  by mouth.   FLUZONE HIGH-DOSE 0.5 ML Suspension Prefilled Syringe injection   Yes No   Sig: TO BE ADMINISTERED BY PHARMACIST FOR IMMUNIZATION   PNEUMOVAX 23 25 MCG/0.5ML Injection   Yes No   Sig: TO BE ADMINISTERED BY PHARMACIST FOR IMMUNIZATION   QUEtiapine (SEROQUEL) 25 MG Tab   No No   Sig: TAKE 1 TABLET BY MOUTH AT BEDTIME FOR 2 DAYS TAKE 2 TABLETS BY MOUTH AT BEDTIME   atorvastatin (LIPITOR) 20 MG Tab   No No   Sig: Take 1 Tab by mouth every day.   carbidopa-levodopa (SINEMET)  MG Tab   No No   Sig: Take 1 Tab by mouth 2 times a day. Take at 7AM and 4PM   dorzolamide-timolol (COSOPT) 22.3-6.8 MG/ML Solution   Yes No   esomeprazole (NEXIUM) 40 MG delayed-release capsule   No No   Sig: TAKE 1 CAPSULE DAILY   latanoprost (XALATAN) 0.005 % Solution   Yes No   losartan (COZAAR) 50 MG Tab   No No   Sig: Take 1 Tab by mouth every day.   losartan (COZAAR) 50 MG Tab   No No   Sig: Take 1 Tab by mouth every day.      Facility-Administered Medications: None       Physical Exam  Temp:  [36.3 °C (97.3 °F)] 36.3 °C (97.3 °F)  Pulse:  [75-88] 75  Resp:  [5-18] 5  BP: (140-159)/(65-74) 140/65  SpO2:  [93 %-96 %] 93 %    Physical Exam  Vitals signs reviewed.   Constitutional:       General: He is not in acute distress.      Appearance: He is ill-appearing.   HENT:      Head: Normocephalic and atraumatic.      Nose: No congestion.      Mouth/Throat:      Mouth: Mucous membranes are dry.   Eyes:      Extraocular Movements: Extraocular movements intact.      Pupils: Pupils are equal, round, and reactive to light.   Neck:      Musculoskeletal: Neck supple.   Cardiovascular:      Rate and Rhythm: Normal rate and regular rhythm.      Pulses: Normal pulses.      Heart sounds: Normal heart sounds.   Pulmonary:      Effort: Pulmonary effort is normal. No respiratory distress.      Breath sounds: Normal breath sounds. No wheezing.   Abdominal:      General: Bowel sounds are normal. There is no distension.      Palpations: Abdomen is soft.      Tenderness: There is no tenderness.   Musculoskeletal:         General: No swelling.   Skin:     General: Skin is warm and dry.      Capillary Refill: Capillary refill takes 2 to 3 seconds.   Neurological:      General: No focal deficit present.      Mental Status: He is alert and oriented to person, place, and time. Mental status is at baseline.      Comments: Pill rolling tremor    Psychiatric:         Mood and Affect: Mood normal.         Behavior: Behavior normal.         Thought Content: Thought content normal.         Judgment: Judgment normal.         Laboratory:  Recent Labs     01/18/20  1905   WBC 8.1   RBC 5.10   HEMOGLOBIN 15.7   HEMATOCRIT 47.3   MCV 92.7   MCH 30.8   MCHC 33.2*   RDW 47.6   PLATELETCT 190   MPV 10.0     Recent Labs     01/18/20  1905   SODIUM 138   POTASSIUM 4.4   CHLORIDE 106   CO2 22   GLUCOSE 103*   BUN 17   CREATININE 1.04   CALCIUM 9.0     Recent Labs     01/18/20  1905   ALTSGPT 5   ASTSGOT 21   ALKPHOSPHAT 72   TBILIRUBIN 0.5   GLUCOSE 103*     Recent Labs     01/18/20  1905   INR 1.07     No results for input(s): NTPROBNP in the last 72 hours.      Recent Labs     01/18/20  1905   TROPONINT 24*       Urinalysis:    No results found     Imaging:  DX-CHEST-PORTABLE (1  VIEW)   Final Result      No acute cardiopulmonary abnormality.      CT-HEAD W/O   Final Result      1. No CT evidence of acute infarct, hemorrhage or mass.   2. Moderate to severe global parenchymal atrophy. Chronic small vessel ischemic changes.      EC-ECHOCARDIOGRAM COMPLETE W/O CONT    (Results Pending)         Assessment/Plan:  I anticipate this patient is appropriate for observation status at this time.    * Syncope  Assessment & Plan  Unclear etiology, mild elevated troponin with out acute changes on EKG  He denies any chest pain, or preceding symptoms beside dizziness but he also has dementia at baseline   Admit to telemetery   Serial troponin   Orthostatic BP   Cortisol levels  Check echocardiogram   consider further imaging with carotid ultrasound as appropriate    Elevated troponin  Assessment & Plan  No chest pain  Cardiac monitoring and serial troponin ordered   Echocardiogram     Dementia associated with other underlying disease without behavioral disturbance (HCC)- (present on admission)  Assessment & Plan  Resume home quetapine qhs     GENIA (obstructive sleep apnea)- (present on admission)  Assessment & Plan  cpap ordered    Mixed hyperlipidemia- (present on admission)  Assessment & Plan  Statin therapy     Essential hypertension- (present on admission)  Assessment & Plan  Resume home arb     Parkinson disease (HCC)- (present on admission)  Assessment & Plan  Resume home sinemet      VTE prophylaxis: heparin

## 2020-01-19 NOTE — ED NOTES
Report given to micki bed side plan of care made clear. Pt stable to transport. Pt placed on monitor.

## 2020-01-19 NOTE — ED PROVIDER NOTES
ED Provider Note    Scribed for Tori Dykes M.D. by Dayan Mina. 1/18/2020, 7:14 PM.    Primary care provider: Edwar Beal M.D.  Means of arrival: Ambulance  History obtained from: Patient  History limited by: None     CHIEF COMPLAINT  Chief Complaint   Patient presents with   • Altered Mental Status   • Syncope       HPI  Michael Packer is a 79 y.o. male who presents to the Emergency Department for loss of consciousness onset prior to arrival. The patient states he was eating dinner with some friends when he lost consciousness. His wife thought he had fallen asleep but when she nudged him and called his name he was not responding. The patient was confused when he regained consciousness and his speech was slurred. The patient denies dizziness or head injury prior to loss of consciousness. He had a similar episode in May 2019 but he was not brought to the ED. He has not been started on any new medications in the last 3 months. The patient has a history of Parkinson disease.  Patient was incontinent of urine during the event.  However there is no report of shaking and he does not have a history of seizures in the past.  He did not report any preceding dizziness or feeling ill prior to passing out.  He does not currently report any difficulty speaking.  He is not complaining of any numbness or weakness on one side of his body.  Wife states he seems back to normal now.    REVIEW OF SYSTEMS  Pertinent positives include loss of consciousness, slurred speech. Pertinent negatives include no dizziness or head injury, recent illness or fevers, chest pain, difficulty breathing, back pain, blood thinners, generalized shaking, numbness or weakness to one side of his body. All other systems reviewed and negative.     PAST MEDICAL HISTORY   has a past medical history of Chickenpox, Daytime sleepiness, GERD (gastroesophageal reflux disease), Glaucoma, Heartburn, Hyperlipidemia, Hypertension, Kidney stone,  "Mumps, Muscle disorder, Sleep apnea, and Wears glasses.    SURGICAL HISTORY   has a past surgical history that includes arthroscopy, knee.    SOCIAL HISTORY  Social History     Tobacco Use   • Smoking status: Never Smoker   • Smokeless tobacco: Never Used   Substance Use Topics   • Alcohol use: Yes     Comment: very very rarely   • Drug use: No      Social History     Substance and Sexual Activity   Drug Use No       FAMILY HISTORY  Family History   Problem Relation Age of Onset   • Lung Disease Father    • Diabetes Paternal Grandfather        CURRENT MEDICATIONS  Home Medications    **Home medications have not yet been reviewed for this encounter**         ALLERGIES  No Known Allergies    PHYSICAL EXAM  VITAL SIGNS: /74   Pulse 88   Temp 36.3 °C (97.3 °F) (Temporal)   Resp 18   Ht 1.651 m (5' 5\")   Wt 76.2 kg (168 lb)   SpO2 96%   BMI 27.96 kg/m²     Constitutional:  Well developed, No acute distress, Non-toxic appearance.   HENT: Normocephalic, Atraumatic, Bilateral external ears normal, Oropharynx moist, No oral exudates, Nose normal.  No evidence of trauma to the tongue  Eyes: PERRL, EOMI, Conjunctiva normal  Neck: Normal range of motion, No tenderness, Supple  Cardiovascular: Normal heart rate, Normal rhythm,  Thorax & Lungs: Normal breath sounds, No respiratory distress, No wheezing, No chest tenderness.   Abdomen: Benign abdominal exam, no guarding no rebound,  no tenderness, no distention  Skin: Warm, Dry, No erythema, No rash.   Back: No tenderness, No CVA tenderness.   Extremities: Intact distal pulses, 1+ pitting edema, No tenderness   Neurologic: Alert & oriented x 3, Normal motor function, Normal sensory function, No focal deficits noted.  No slurred speech, no facial droop, mild tremor to bilateral arms, cogwheel rigidity  Psychiatric: Appropriate                                                         DIAGNOSTIC STUDIES / PROCEDURES\    LABS  Results for orders placed or performed during " the hospital encounter of 01/18/20   CBC WITH DIFFERENTIAL   Result Value Ref Range    WBC 8.1 4.8 - 10.8 K/uL    RBC 5.10 4.70 - 6.10 M/uL    Hemoglobin 15.7 14.0 - 18.0 g/dL    Hematocrit 47.3 42.0 - 52.0 %    MCV 92.7 81.4 - 97.8 fL    MCH 30.8 27.0 - 33.0 pg    MCHC 33.2 (L) 33.7 - 35.3 g/dL    RDW 47.6 35.9 - 50.0 fL    Platelet Count 190 164 - 446 K/uL    MPV 10.0 9.0 - 12.9 fL    Neutrophils-Polys 63.20 44.00 - 72.00 %    Lymphocytes 22.70 22.00 - 41.00 %    Monocytes 9.60 0.00 - 13.40 %    Eosinophils 3.10 0.00 - 6.90 %    Basophils 0.50 0.00 - 1.80 %    Immature Granulocytes 0.90 0.00 - 0.90 %    Nucleated RBC 0.00 /100 WBC    Neutrophils (Absolute) 5.11 1.82 - 7.42 K/uL    Lymphs (Absolute) 1.84 1.00 - 4.80 K/uL    Monos (Absolute) 0.78 0.00 - 0.85 K/uL    Eos (Absolute) 0.25 0.00 - 0.51 K/uL    Baso (Absolute) 0.04 0.00 - 0.12 K/uL    Immature Granulocytes (abs) 0.07 0.00 - 0.11 K/uL    NRBC (Absolute) 0.00 K/uL   COMP METABOLIC PANEL   Result Value Ref Range    Sodium 138 135 - 145 mmol/L    Potassium 4.4 3.6 - 5.5 mmol/L    Chloride 106 96 - 112 mmol/L    Co2 22 20 - 33 mmol/L    Anion Gap 10.0 0.0 - 11.9    Glucose 103 (H) 65 - 99 mg/dL    Bun 17 8 - 22 mg/dL    Creatinine 1.04 0.50 - 1.40 mg/dL    Calcium 9.0 8.5 - 10.5 mg/dL    AST(SGOT) 21 12 - 45 U/L    ALT(SGPT) 5 2 - 50 U/L    Alkaline Phosphatase 72 30 - 99 U/L    Total Bilirubin 0.5 0.1 - 1.5 mg/dL    Albumin 4.1 3.2 - 4.9 g/dL    Total Protein 6.5 6.0 - 8.2 g/dL    Globulin 2.4 1.9 - 3.5 g/dL    A-G Ratio 1.7 g/dL   TROPONIN   Result Value Ref Range    Troponin T 24 (H) 6 - 19 ng/L   PRTOTHROMBIN TIME (INR)   Result Value Ref Range    PT 14.1 12.0 - 14.6 sec    INR 1.07 0.87 - 1.13   ESTIMATED GFR   Result Value Ref Range    GFR If African American >60 >60 mL/min/1.73 m 2    GFR If Non African American >60 >60 mL/min/1.73 m 2   EKG   Result Value Ref Range    Report       [facility] Emergency Dept.    Test Date:   2020  Pt Name:    ESTER JACKSON                Department: ER  MRN:        [id number]                      Room:       [facility]  Gender:     Male                         Technician: [id number]  :        [id number]                   Requested By:ER TRIAGE PROTOCOL  Order #:    [id number]                    Reading MD: Tori Lopez    Measurements  Intervals                                Axis  Rate:       72                           P:          62  WV:         136                          QRS:        4  QRSD:       86                           T:  QT:         448  QTc:        491    Interpretive Statements  SINUS RHYTHM  BORDERLINE LOW VOLTAGE IN FRONTAL LEADS  BORDERLINE T WAVE ABNORMALITIES  BORDERLINE PROLONGED QT INTERVAL  No previous ECG available for comparison  Electronically Signed On 2020 21:20:10 PST by Tori Lopez         All labs reviewed by me.    EKG  12 lead EKG interpreted by me.       RADIOLOGY  DX-CHEST-PORTABLE (1 VIEW)   Final Result      No acute cardiopulmonary abnormality.      CT-HEAD W/O   Final Result      1. No CT evidence of acute infarct, hemorrhage or mass.   2. Moderate to severe global parenchymal atrophy. Chronic small vessel ischemic changes.      EC-ECHOCARDIOGRAM COMPLETE W/O CONT    (Results Pending)     The radiologist's interpretation of all radiological studies have been reviewed by me.    COURSE & MEDICAL DECISION MAKING  Nursing notes, VS, PMSFHx reviewed in chart.     Patient presented to the emergency department with a episode of altered mental status and syncope.  Patient was incontinent of urine but otherwise there was no generalized shaking that would suggest seizure.  Does not have a history of seizures in the past.  Patient is not had any fevers or recent illness to suggest an infectious etiology for his syncope.  There was no preceding dizziness or chest pain but I cannot totally exclude a arrhythmia causing the symptoms.  I do not suspect ACS at this  time.  Patient does not look dehydrated but will check labs to evaluate for electrolyte abnormalities.  The syncope could be related to his Parkinson's.  Patient is not having any focal weakness and has a non-focal neurologic exam currently besides some mild tremors and other findings suggestive of his Parkinson's disease.  Therefore I do not suspect stroke at this time.    7:14 PM Patient seen and examined at bedside. Ordered for Dx-Chest, Dx-Head w/o, CBC, CMP, Troponin, PT/INR, EKG and Estimated GFR to evaluate.      CT of the head does not show any evidence of acute infarct or hemorrhage.  There is evidence of severe global atrophy.  EKG shows some nonspecific changes but no evidence of acute MI, he had a minimally elevated troponin that is in the indeterminate range.  He is not having any active chest pain.  Laboratory studies show normal white count without evidence of bandemia.  There is no evidence of significant electrolyte abnormalities.  I have not observed any arrhythmias on the monitor.  Patient will be admitted to the hospital for monitoring overnight.    9:59 PM I discussed the patient's case and the above findings with Dr. Stephens (Hospitalist) who agrees to evaluate the patient for hospitalization.       DISPOSITION:  Patient will be hospitalized by Dr. Stephens in guarded condition.       FINAL IMPRESSION  1. Syncope, unspecified syncope type    2. Parkinson disease (HCC)          IDayan (Scribe), am scribing for, and in the presence of, Tori Dykes M.D..    Electronically signed by: Dayan Mina ([email or web address]), 1/18/2020    Tori HUBBARD M.D. personally performed the services described in this documentation, as scribed by Dayan Mina in my presence, and it is both accurate and complete.  C  The note accurately reflects work and decisions made by me.  Tori Dykes M.D.  1/18/2020  10:25 PM

## 2020-01-19 NOTE — DISCHARGE INSTRUCTIONS
Discharge Instructions    Discharged to home by car with relative. Discharged via wheelchair, hospital escort: Yes.  Special equipment needed: Not Applicable    Be sure to schedule a follow-up appointment with your primary care doctor or any specialists as instructed.     Discharge Plan:   Influenza Vaccine Indication: Not indicated: Previously immunized this influenza season and > 8 years of age    I understand that a diet low in cholesterol, fat, and sodium is recommended for good health. Unless I have been given specific instructions below for another diet, I accept this instruction as my diet prescription.   Other diet: Resume normal diet      Special Instructions: None    · Is patient discharged on Warfarin / Coumadin?   No       Depression / Suicide Risk    As you are discharged from this [facility] facility, it is important to learn how to keep safe from harming yourself.    Recognize the warning signs:  · Abrupt changes in personality, positive or negative- including increase in energy   · Giving away possessions  · Change in eating patterns- significant weight changes-  positive or negative  · Change in sleeping patterns- unable to sleep or sleeping all the time   · Unwillingness or inability to communicate  · Depression  · Unusual sadness, discouragement and loneliness  · Talk of wanting to die  · Neglect of personal appearance   · Rebelliousness- reckless behavior  · Withdrawal from people/activities they love  · Confusion- inability to concentrate     If you or a loved one observes any of these behaviors or has concerns about self-harm, here's what you can do:  · Talk about it- your feelings and reasons for harming yourself  · Remove any means that you might use to hurt yourself (examples: pills, rope, extension cords, firearm)  · Get professional help from the community (Mental Health, Substance Abuse, psychological counseling)  · Do not be alone:Call your Safe Contact- someone whom you trust who  will be there for you.  · Call your local CRISIS HOTLINE 380-5646 or 199-651-9107  · Call your local Children's Mobile Crisis Response Team [place] (433) 631-2010 or www.[employer]  · Call the toll free National Suicide Prevention Hotlines   · National Suicide Prevention Lifeline 192-967-JLCV (7646)  · National [employer] Line Network 800-SUICIDE (865-6662)

## 2020-01-19 NOTE — CARE PLAN
Problem: Communication  Goal: The ability to communicate needs accurately and effectively will improve  1/19/2020 1542 by Michelle Baer R.N.  Outcome: MET  1/19/2020 1344 by Michelle Baer R.N.  Outcome: PROGRESSING AS EXPECTED     Problem: Infection  Goal: Will remain free from infection  Outcome: MET     Problem: Venous Thromboembolism (VTW)/Deep Vein Thrombosis (DVT) Prevention:  Goal: Patient will participate in Venous Thrombosis (VTE)/Deep Vein Thrombosis (DVT)Prevention Measures  Outcome: MET     Problem: Bowel/Gastric:  Goal: Normal bowel function is maintained or improved  Outcome: MET  Goal: Will not experience complications related to bowel motility  Outcome: MET     Problem: Knowledge Deficit  Goal: Knowledge of disease process/condition, treatment plan, diagnostic tests, and medications will improve  Outcome: MET  Goal: Knowledge of the prescribed therapeutic regimen will improve  Outcome: MET     Problem: Discharge Barriers/Planning  Goal: Patient's continuum of care needs will be met  Outcome: MET     Problem: Fluid Volume:  Goal: Will maintain balanced intake and output  Outcome: MET     Problem: Skin Integrity  Goal: Risk for impaired skin integrity will decrease  Outcome: MET     Problem: Respiratory:  Goal: Respiratory status will improve  Outcome: MET     Problem: Urinary Elimination:  Goal: Ability to reestablish a normal urinary elimination pattern will improve  Outcome: MET

## 2020-01-20 NOTE — PROGRESS NOTES
Patient discharged with his wife at 1530. All belongings with patient. Discharge instructions explained and given to patient.

## 2020-01-27 NOTE — PROGRESS NOTES
POST DISCHARGE CALL:  Discharge Date:1/19/2020   Date of Outreach Call: 1/20/2020  2:38 PM  Now that you're home, how are you doing? Fair  Comment:CM spoke to patients spouse. Reports pt is  currently sleeping. Spouse reports she is managing patients  care. Denies needing any resources at this time.  Do you have questions about your medications? No    Did you fill your medications? Yes  Comment:No new medications ordered    Do you have a follow-up appointment scheduled?Yes  Comment:PCP 1/27/20    Discharging Department: Telemetry 8    Number of Attempts: 1  Current or previous attempts completed within two business days of discharge? Yes  Provided education regarding treatment plan, medication, self-management, ADLs? Yes  Has patient completed Advance Directive? If yes, advise them to bring to appointment. Yes  Care Manager phone number provided? Yes  Is there anything else I can help you with? No    CC: Hospital discharge follow-up    HPI:   Michael presents today for posthospitalization follow-up, 1/18- 1/19/2020    Patient was admitted for syncopal episodes on 1/18/2020.  He was admitted to the telemetry floor , no evidence of arrhythmias were detected. Patient had ECHO showed :L  Left ventricular ejection fraction is visually estimated to be 55%.  Estimated right ventricular systolic pressure is 45 mmHg.   and had normal orthostatics .  During the hospital stay he had no recurrence of his symptoms.Patient's wife said  that a similar episode happened about a year ago with similar social situation with lots of stimulation. Patient was discharged in stable condition on 1/19/2020.      Came in today for follow-up.  Since she was discharged he had no similar episodes.  So he denies any syncopal episodes, chest pain, dizziness, nausea, vomiting.  His blood pressure has been adequately controlled on losartan 50 mg daily, has been tolerating his Sinemet  mg 2 times a day for his Parkinson disease.      Patient  "Active Problem List    Diagnosis Date Noted   • Skin lesion 08/01/2019   • Parkinson disease (HCC) 08/13/2018   • Essential hypertension 08/13/2018   • Mixed hyperlipidemia 08/13/2018   • GENIA (obstructive sleep apnea) 08/13/2018   • Dementia associated with other underlying disease without behavioral disturbance (HCC) 08/13/2018   • Incontinence of feces 08/13/2018       Current Outpatient Medications   Medication Sig Dispense Refill   • atorvastatin (LIPITOR) 20 MG Tab Take 20 mg by mouth every evening.     • carbidopa-levodopa (SINEMET)  MG Tab Take 1 Tab by mouth 2 Times a Day. @@ 0800 and 1600.     • esomeprazole (NEXIUM) 40 MG delayed-release capsule Take 40 mg by mouth every morning before breakfast.     • losartan (COZAAR) 50 MG Tab Take 50 mg by mouth every day.     • dorzolamide-timolol (COSOPT) 22.3-6.8 MG/ML Solution Place 1 Drop in both eyes 2 times a day.     • latanoprost (XALATAN) 0.005 % Solution Place 1 Drop in both eyes every bedtime.       No current facility-administered medications for this visit.          Allergies as of 01/27/2020   • (No Known Allergies)        ROS: Denies any chest pain, Shortness of breath, Changes bowel or bladder, Lower extremity edema.    Physical Exam:  /70 (BP Location: Right arm, Patient Position: Sitting, BP Cuff Size: Adult)   Pulse 69   Temp 36.3 °C (97.4 °F) (Temporal)   Resp 16   Ht 1.676 m (5' 6\")   Wt 73 kg (161 lb)   SpO2 95%   BMI 25.99 kg/m²   Gen.: Well-developed, well-nourished, no apparent distress,pleasant and cooperative with the examination  Skin:  Warm and dry with good turgor. No rashes or suspicious lesions in visible areas  Eye: PERRLA, conjunctiva and sclera clear, lids normal  HEENT:Sinuses nontender with palpation, TMs clear, nares patent with pink mucosa, and clear rhinorrhea,no septal deviation ,polyps or lesions. lips without lesions, oropharynx clear.  Neck: Trachea midline,no masses or adenopathy. No JVD.  Thyroid: normal " consistency and size. No masses or nodules. Not tender with palpation.  Cor: Regular rate and rhythm without murmur, gallop or rub.  Lungs: Respirations unlabored.Clear to auscultation with equal breath sounds bilaterally. No wheezes, rhonchi.  Abdomen: Soft nontender without hepatosplenomegaly or masses appreciated, normoactive bowel sounds. No hernias.  Extremities: No cyanosis, clubbing or edema, Symmetrical without deformities or malformations. Pulses 2+ and symmetrical both upper and lower extremities  Psych: Alert and oriented x 3.Normal affect, judgement,insight and memory.    Assessment and Plan.   79 y.o. male     1. Hospital discharge follow-up  Hospital discharge summary reviewed.    2. Syncope, unspecified syncope type  Stable.  No similar condition since he was discharged.  It was possibly environmental induced.  However I will send patient for Holter monitor and carotid ultrasound    - US-CAROTID DOPPLER BILAT; Future  - Holter Monitor / Event Recorder; Future    3. Essential hypertension  Controlled.  Continue losartan 50 mg daily

## 2020-02-06 NOTE — TELEPHONE ENCOUNTER
Received request via: Pharmacy    Was the patient seen in the last year in this department? No     Does the patient have an active prescription (recently filled or refills available) for medication(s) requested? Yes.

## 2020-03-06 NOTE — PROGRESS NOTES
CC: Parkinson disease, history of syncopal episode, hypertension, hyperlipidemia    HPI:   Michael presents today discuss the following:    Parkinson disease (HCC)  As per wife he has been having a progressive decline in his cognition.  However this moment comes and goes. He is currently on Sinemet  mg twice a day.  No side effects.  He has been following up with neurology Dr. Welsh    H/O syncope  Patient was admitted in January for a syncopal episode.  Was seen for posthospitalization follow-up and was sent for carotid ultrasound and Holter ultrasound both of which showed no significant abnormality.  Patient denies any similar episodes.     Essential hypertension  Has been adequately controlled on current medication. Denies headache, chest pain, and SOB, has been on  losartan 50 mg daily.  No side effects    Mixed hyperlipidemia  He has been tolerating the statin. Denies muscle pain LFTs has been normal, patient is currently on atorvastatin 20 mg daily.  No history of diabetes, CAD, or stroke.    Patient Active Problem List    Diagnosis Date Noted   • Skin lesion 08/01/2019   • Parkinson disease (HCC) 08/13/2018   • Essential hypertension 08/13/2018   • Mixed hyperlipidemia 08/13/2018   • GENIA (obstructive sleep apnea) 08/13/2018   • Dementia associated with other underlying disease without behavioral disturbance ([facility]) 08/13/2018   • Incontinence of feces 08/13/2018       Current Outpatient Medications   Medication Sig Dispense Refill   • carbidopa-levodopa (SINEMET)  MG Tab TAKE 1 TABLET TWICE A DAY TAKE AT 7 A.M. AND 4 P.M. 180 Tab 4   • atorvastatin (LIPITOR) 20 MG Tab Take 20 mg by mouth every evening.     • esomeprazole (NEXIUM) 40 MG delayed-release capsule Take 40 mg by mouth every morning before breakfast.     • losartan (COZAAR) 50 MG Tab Take 50 mg by mouth every day.     • dorzolamide-timolol (COSOPT) 22.3-6.8 MG/ML Solution Place 1 Drop in both eyes 2 times a day.     • latanoprost  "(XALATAN) 0.005 % Solution Place 1 Drop in both eyes every bedtime.       No current facility-administered medications for this visit.          Allergies as of 03/06/2020   • (No Known Allergies)        ROS: Denies any chest pain, Shortness of breath, Changes bowel or bladder, Lower extremity edema.    Physical Exam:  /82 (BP Location: Right arm, Patient Position: Sitting, BP Cuff Size: Adult)   Pulse 98   Temp 37.1 °C (98.7 °F) (Temporal)   Resp 16   Ht 1.676 m (5' 6\")   Wt 72.7 kg (160 lb 4.4 oz)   SpO2 93%   BMI 25.87 kg/m²   Gen.: Well-developed, well-nourished, no apparent distress,pleasant and cooperative with the examination  Skin:  Warm and dry with good turgor. No rashes or suspicious lesions in visible areas  HEENT:Sinuses nontender with palpation, TMs clear, nares patent with pink mucosa and clear rhinorrhea,no septal deviation ,polyps or lesions. lips without lesions, oropharynx clear.  Neck: Trachea midline,no masses or adenopathy. No JVD.  Cor: Regular rate and rhythm without murmur, gallop or rub.  Lungs: Respirations unlabored.Clear to auscultation with equal breath sounds bilaterally. No wheezes, rhonchi.  Extremities: No cyanosis, clubbing or edema.      Assessment and Plan.   79 y.o. male     1. Parkinson disease (HCC)  Has been having progressive decline in cognition.  Continue on Sinemet  mg twice a day.  Continue follow-up with neurology.    2. H/O syncope  Stable.  No similar episodes.  Carotid ultrasound showed no occlusion, Holter monitor showed no significant abnormality.    3. Essential hypertension  Controlled.  Continue losartan 50 mg daily.    4. Mixed hyperlipidemia  He has been tolerating the statin. Denies muscle pain LFTs has been normal  Continue on atorvastatin 20 mg daily.      "

## 2020-04-08 NOTE — TELEPHONE ENCOUNTER
Dr. Brown,    Patients wife called on 4/8/2020 and stated that this patient is having real problems with his bowels and would like to know if there is anything maybe with his medication or should he go see his PCP?  Please advise.

## 2020-04-09 NOTE — TELEPHONE ENCOUNTER
What specific problems with his bowels exist?  It is not his medications.  Parkinson's disease is associated with severe constipation, typically over-the-counter laxatives such as Senokot, Colace, MiraLAX, etc. can all be used safely and effectively.  On the other hand, if diarrhea is becoming a problem, then they will likely need to talk with his PCP.

## 2020-07-06 NOTE — PROGRESS NOTES
CC: Hypertension, hyperlipidemia, acid reflux, Parkinson disease, chronic insomnia    HPI:   Michael presents today discuss the following medical issues    Essential hypertension  Has been adequately controlled on current medication. Denies headache, chest pain, and SOB, has been on  losartan 50 mg daily.  No side effects     Mixed hyperlipidemia  He has been tolerating the statin. Denies muscle pain LFTs has been normal, patient is currently on atorvastatin 20 mg daily.  No history of diabetes, CAD, or stroke.    Gastroesophageal reflux disease without esophagitis  Patient has been doing fine on Nexium 40 mg once a day    Parkinson disease ([facility])  Wife reported that he is physical activity and weakness has progressed.  Patient used to take Sinemet 25/100 3 times a day with dopamine agonist ( Ropinirole) in the past but she developed some side effects. . He is currently on Sinemet  mg twice a day.  Has an appointment with neurology Dr. Welsh the end of the month.     Chronic insomnia  Patient has been having a problem falling and staying asleep.  Has been on over-the-counter sleeping aid, and it has not been helping.     Patient Active Problem List    Diagnosis Date Noted   • GERD (gastroesophageal reflux disease)    • Skin lesion 08/01/2019   • Parkinson disease ([facility]) 08/13/2018   • Essential hypertension 08/13/2018   • Mixed hyperlipidemia 08/13/2018   • GENIA (obstructive sleep apnea) 08/13/2018   • Dementia associated with other underlying disease without behavioral disturbance ([facility]) 08/13/2018   • Incontinence of feces 08/13/2018       Current Outpatient Medications   Medication Sig Dispense Refill   • traZODone (DESYREL) 50 MG Tab Take 1 Tab by mouth every bedtime. 90 Tab 3   • losartan (COZAAR) 50 MG Tab Take 1 Tab by mouth every day. 90 Tab 1   • atorvastatin (LIPITOR) 20 MG Tab TAKE 1 TABLET DAILY 90 Tab 3   • esomeprazole (NEXIUM) 40 MG delayed-release capsule TAKE 1 CAPSULE DAILY 90 Cap 3   •  "carbidopa-levodopa (SINEMET)  MG Tab TAKE 1 TABLET TWICE A DAY TAKE AT 7 A.M. AND 4 P.M. 180 Tab 4   • dorzolamide-timolol (COSOPT) 22.3-6.8 MG/ML Solution Place 1 Drop in both eyes 2 times a day.     • latanoprost (XALATAN) 0.005 % Solution Place 1 Drop in both eyes every bedtime.       No current facility-administered medications for this visit.          Allergies as of 07/06/2020   • (No Known Allergies)        ROS: Denies any chest pain, Shortness of breath, Changes bowel or bladder, Lower extremity edema.    Physical Exam:  /74 (BP Location: Right arm, Patient Position: Sitting, BP Cuff Size: Adult)   Pulse 76   Temp 37.3 °C (99.1 °F) (Temporal)   Resp 16   Ht 1.676 m (5' 6\")   Wt 68.5 kg (151 lb)   SpO2 95%   BMI 24.37 kg/m²   Gen.: Well-developed, well-nourished, no apparent distress,pleasant and cooperative with the examination  Skin:  Warm and dry with good turgor. No rashes or suspicious lesions in visible areas  Eye: PERRLA, conjunctiva and sclera clear, lids normal  HEENT:Sinuses nontender with palpation, TMs clear, nares patent with pink mucosa, and clear rhinorrhea,no septal deviation ,polyps or lesions. lips without lesions, oropharynx clear.  Neck: Trachea midline,no masses or adenopathy. No JVD.  Thyroid: normal consistency and size. No masses or nodules. Not tender with palpation.  Cor: Regular rate and rhythm without murmur, gallop or rub.  Lungs: Respirations unlabored.Clear to auscultation with equal breath sounds bilaterally. No wheezes, rhonchi.  Abdomen: Soft nontender without hepatosplenomegaly or masses appreciated, normoactive bowel sounds. No hernias.  Extremities: No cyanosis, clubbing or edema, Symmetrical without deformities or malformations. Pulses 2+ and symmetrical both upper and lower extremities  Psych: Alert and oriented x 3.Normal affect, abnormal judgement,insight and memory.        Assessment and Plan.   79 y.o. male     1. Essential hypertension  Stable.  " Continue on losartan 50 mg daily    2. Mixed hyperlipidemia  He has been tolerating the statin. Denies muscle pain LFTs has been normal  Continue on atorvastatin 20 mg daily    3. Gastroesophageal reflux disease without esophagitis  Patient has been doing fine on Nexium 40 mg once a day    4. Parkinson disease (HCC)  Wife reported a progression of the disease.  Recommend increase Sinemet(25/100) to 3 times a day until he sees a neurologist Dr. Welsh, patient has appointment at the end of the month.    5. Chronic insomnia  Over-the-counter sleeping aid has not been helping.  I will start patient on trazodone 50 mg nightly.    - traZODone (DESYREL) 50 MG Tab; Take 1 Tab by mouth every bedtime.  Dispense: 90 Tab; Refill: 3

## 2020-07-13 NOTE — TELEPHONE ENCOUNTER
Patient is experiencing side effects to a medication that was given to him. I call him back to see what type a reaction. I left a voicemail

## 2020-07-17 NOTE — PROGRESS NOTES
Received referral from [facility]. Medications reviewed. No clinically significant interactions noted.     Jad Gomez, PharmD, MS, BCACP, LCC    This note was created using voice recognition software (Dragon). The accuracy of the dictation is limited by the abilities of the software. I have reviewed the note prior to signing, however some errors in grammar and context are still possible. If you have any questions related to this note please do not hesitate to contact our office.

## 2020-07-21 NOTE — PROGRESS NOTES
PT evaluation completed on 7/20/20.  Effective 7/20/20, frequency 2 week 3.  Please assist with further authorization as needed.

## 2020-07-22 NOTE — TELEPHONE ENCOUNTER
Patient's wife is concerned. The patient has not been sleeping and is taking 50 mg of trazodone. He has some confusion, agitated, and hallucinations. Please advise

## 2020-07-31 NOTE — PROGRESS NOTES
"Subjective:      Eric Packer is a 80 y.o. male who presents with his wife Cecilia and his son Fred, for 6-month follow-up, with a history of moderate Parkinson's disease and mild dementia.     HPI    Since last seen, Eric had been having real problems with hallucinations, on a combination of Requip ER and Sinemet 25/100 tablets, we got him off the Requip, the hallucinations have improved noticeably, but he still has occasional episodes where he is seen bugs around the house and room.  These tend to happen towards the afternoons, but they can be a problem at any other time of day.  They are not unsettling, he states there becoming more of a regular friend.    Off Requip, with less dopamine stimulation, he is much slower in general.  The fluctuations are more noticeable, on good days he actually is up and about doing his own thing, but unfortunately he does not have his walker or cane with him.  Cecilia found him yesterday morning, a good day, in the shower.  Speech is much softer, he is having more difficulty with swallowing, speech therapy has been going out to their home to help regularly.  Because of the instability as he walks, physical and occupational therapies are also there on a regular, daily basis.    He has lost some appetite, his weight has come down.  Sinemet is still causing some mild queasiness and heartburn when he takes it, the first dose at 7 AM, repeated at 4 PM.  There is some benefit in the morning after the first dose, though this wears off, no one is convinced that there is an non-affect following the mid afternoon dose.  There are no dyskinesias.    His dementia has not gotten worse, he is still very slow to participate in conversation, though he can answer for himself.  He can participate in his ADLs.  The bigger issues have more to do with what Cecilia calls \"anticipation\" with increased anxiety.  There are no other unusual stressors around the home though of course " COVID-19 is not helping matters.  It is simply more an issue of how well he is going to do if he stands and walks, etc.    He has had one episode of near syncope/syncope, this occurring while he was still seated.  Cecilia was doing his hair at the time.  He felt some palpitations in his chest at the same time.  He has not had any other episodes of actual syncope.  Vital signs have been checked on a daily basis by visiting nurses, Cecilia remembers being told that his systolic pressure has come down to the high 80s at times.    Medical, surgical and family histories are reviewed in the electronic health record, there are no new drug allergies.  He is on Sinemet 25/100, twice daily, Cozaar 50 mg daily, trazodone, Lipitor 20 mg daily, Nexium, and vitamin D with calcium.    Review of Systems   Gastrointestinal: Negative for constipation.   Musculoskeletal: Negative for falls.   Neurological: Positive for tremors.   Psychiatric/Behavioral: Positive for memory loss. The patient is nervous/anxious. The patient does not have insomnia.    All other systems reviewed and are negative.       Objective:     /72 (BP Location: Left arm, Patient Position: Sitting, BP Cuff Size: Adult)   Pulse 76      Physical Exam    He appears calm at this time in a wheelchair, and is in no acute distress.  He is quite pleasant.  His vital signs are stable, blood pressure 118/72. There is no malar rash, there is scialorrhea.  The neck is supple.  Cardiac evaluation is unremarkable.    His mental processing is still slowed, he seems a little bit slower today.  Still, he is fully oriented.    PERRLA/EOMI, the overall hypophonia and bradykinesia is much more marked.  There is actually some mild tachyphemia but no dysarthria.  Visual fields are grossly full.  Facial movements are symmetric.    Musculoskeletal exam as above, indicates much more noticeable bradykinesia, rigidity especially in the upper extremities more pronounced.  There is  no tremor.  There is no drift, strength is intact throughout.    There is more profound postural instability, he is more noticeably retropulsive.  Stride length is diminished, he does not shuffle, but he also requires full assistance to stand up out of the chair as well as to walk.  There is no appendicular dystaxia, the movements are simply slower.  Repetitive movements show a more noticeable reduction in amplitude in the hands.     Assessment/Plan:     1. Parkinson disease (HCC)  Motor symptoms have gotten worse as we have had to cut down the dopamine stimulation.  Unfortunately, even if we were to increase Sinemet, the risk of hallucinations worsening is still there, and though I recommended a trial of slow and incremental increase, they would like to hold.  Even then, slow and incremental increases do not show market improvements with motor symptoms, especially with bradykinesia and rigidity, themselves more refractory to dopamine.  It would require persistent effort.  For the nausea, I recommended carbidopa 25 mg taken with his first dose of Sinemet.  This can effectively increase the Sinemet that is available, hopefully not worsening the hallucinations.  They would like to hold on adding any significant amount of dopamine to his present regimen.  If they were to try, I recommend adding a 0.5-1 tablet additional dose with the afternoon pill, though after be careful because of drowsiness and his increasing hallucinations which tend to occur then.  Essentially, this poor gentleman is between the veritable rock and a hard place.    The incidental near syncopal event he had suggest that may be his blood pressures are little on the low side, this may allow him to be taken off Cozaar, or at least take a reduced dose.  I told Cecilia to talk with his PCP in this regard.    - Carbidopa 25 MG Tab; Take 1 Tab by mouth every day.  Dispense: 30 Tab; Refill: 1    2. Dementia associated with other underlying disease  without behavioral disturbance ([facility])  Stable at this time.  The anxiety seems to be more problematic, but I am uncomfortable adding more medication.  Unfortunately, with anxiety comes some worsening of motor symptoms in a lot of patients.    Time: 20-minute spent face-to-face for exam, review, discussion, and education, of this over 60% of the time spent counseling and coordinating care.

## 2020-08-04 NOTE — PROGRESS NOTES
fyi, patient's BP was 90/58 at PT visit on 8/4/20. Other vitals within parameters. Patient denies any dizziness or light headedness.  Patient is easily fatigued with PT and tends to fall asleep at times.  Wife states patient slept a little better last night but overall still challenged with sleeping at night.  PT notified RN case manager and RN to follow up with patient.  PT encouraged hydration.

## 2020-08-07 NOTE — PROGRESS NOTES
raquel, patient's BP at PT visit on 8/6/20 was 86/56. Patient denies any dizziness or light headedness.  Wife states she will take BP again this afternoon.  PT recommended taking twice daily and keep a log to report to RN case manager/doctor.  Wife agreeable and reports she continues to work on patient getting more fluids.  PT also reviewed hypotension education and safety.

## 2020-08-13 NOTE — PROGRESS NOTES
Pt was having lunch when this nurse arrived. Spouse present. Pt ambulates with seated walker, gait steady. Pt and spouse deny any new trips or falls. Spouse reports that pt starts taking Sinemet 1 tab at 7 am and 2 tabs at 4 pm today. Dose increased. Pt tells this nurse that he is doing well today. Reviewed infection and fall prevention, pt binder, visit frequency and calendar,  24/7 contact phone number. Both pt and spouse voiced und erstanding.

## 2020-08-15 NOTE — PROGRESS NOTES
"Pt lying in couch upon SN arrival. He opened eyes immediately when this nurse greeted him. However, he was disoriented to time, place and situation. Spouse told this nurse that pt has \"good days and bad days,\" and today is \"one of the bad days\" due to hx of Parkinson's and dementia. Pt told this nurse that he was having 7/10 chest pain since last night and \"I'm not feeling well.\" Pt has hx of GERD and heartburn but unable to tell differ ence at this time due to dementia. Spouse said that pt never reported chest pain before and appeared to be more weak and fatigue today. This nurse called [facility] and spoke with Paulina. Paulina recommended to contact PCP or 911 due to presenting symptoms. This nurse then called 911. EMS arrived within 5 min and evaluated pt. No significant findings on EKG, VS remained stable, BG WNL. Pt denied any chest or upper gastric pain upon EM S arrival. Pt and spouse declined going to ER and signed waiver with EMS. EMS and this nurse educated spouse on s/sx of CVA and heart attack, instructed spouse to call 911 immediately if pain returns or new s/sx arise. Spouse voiced understanding. Also reviewed  24/7 contact phone number and encouraged spouse to call if there's any questions or concerns. Spouse voiced understanding to all the above teaching and interventions. "

## 2020-08-19 NOTE — PROGRESS NOTES
"Spouse reports pt is doing well, \"Today is one of those good days!\" She denies any pt trips or falls. No more complain of chest pain per spouse. She is giving pt Pepto Bismuth  and noticed his gastric discomfort has been decreasing since started on this medication. MAR updated. Spouse also reports that pt falling asleep easier when watches old football games. Pt is resting in bed during this visit. Opens eyes immediately when this nurse  calls his name and answers simple questions, such as pain location and levels. Spouse able to location  24/7 number on pt binder and state will call if there's any questions or concerns.    "

## 2020-10-01 NOTE — TELEPHONE ENCOUNTER
Received request via: Patient    Was the patient seen in the last year in this department? Yes    Does the patient have an active prescription (recently filled or refills available) for medication(s) requested? No       Patient last seen 07/31/20

## 2020-12-01 NOTE — PROGRESS NOTES
11/30/20:  Received a Voalt message from YENI Corey that while she was at patients home she was told by the wife that the patient fell yesterday on his right shoulder.  Leora did not see any bruising nor discomfort while assisting the patient.  I called and spoke with patients wife who described the fall:  he was using his walker and started a slow decline, still holding the walker.  He ended up falling onto his right shoulder.  Their son came to assist getting him off the floor. They checked his shoulder, no bruising, no skin damage.  They did range of motion with his shoulder.  Patient denied pain at that time.  This morning the patient said he had a little soreness but by the afternoon it was gone.  The patients wife declined the need for a RN visit.